# Patient Record
Sex: FEMALE | Race: BLACK OR AFRICAN AMERICAN | NOT HISPANIC OR LATINO | Employment: FULL TIME | URBAN - METROPOLITAN AREA
[De-identification: names, ages, dates, MRNs, and addresses within clinical notes are randomized per-mention and may not be internally consistent; named-entity substitution may affect disease eponyms.]

---

## 2017-04-26 ENCOUNTER — APPOINTMENT (OUTPATIENT)
Dept: PHYSICAL THERAPY | Facility: CLINIC | Age: 52
End: 2017-04-26
Payer: COMMERCIAL

## 2017-04-26 PROCEDURE — 97161 PT EVAL LOW COMPLEX 20 MIN: CPT

## 2017-05-03 ENCOUNTER — APPOINTMENT (OUTPATIENT)
Dept: PHYSICAL THERAPY | Facility: CLINIC | Age: 52
End: 2017-05-03
Payer: COMMERCIAL

## 2017-05-10 ENCOUNTER — APPOINTMENT (OUTPATIENT)
Dept: PHYSICAL THERAPY | Facility: CLINIC | Age: 52
End: 2017-05-10
Payer: COMMERCIAL

## 2017-05-10 PROCEDURE — 97112 NEUROMUSCULAR REEDUCATION: CPT

## 2017-05-17 ENCOUNTER — APPOINTMENT (OUTPATIENT)
Dept: PHYSICAL THERAPY | Facility: CLINIC | Age: 52
End: 2017-05-17
Payer: COMMERCIAL

## 2017-05-24 ENCOUNTER — APPOINTMENT (OUTPATIENT)
Dept: PHYSICAL THERAPY | Facility: CLINIC | Age: 52
End: 2017-05-24
Payer: COMMERCIAL

## 2017-05-24 PROCEDURE — 97112 NEUROMUSCULAR REEDUCATION: CPT

## 2017-05-31 ENCOUNTER — APPOINTMENT (OUTPATIENT)
Dept: PHYSICAL THERAPY | Facility: CLINIC | Age: 52
End: 2017-05-31
Payer: COMMERCIAL

## 2017-05-31 PROCEDURE — 97112 NEUROMUSCULAR REEDUCATION: CPT

## 2019-01-29 RX ORDER — NAPROXEN 500 MG/1
1 TABLET ORAL EVERY 12 HOURS
COMMUNITY
Start: 2014-10-30 | End: 2019-01-30

## 2019-01-29 RX ORDER — HYDROCHLOROTHIAZIDE 12.5 MG/1
CAPSULE, GELATIN COATED ORAL
COMMUNITY
End: 2019-01-30

## 2019-01-29 RX ORDER — GUAIFENESIN AND CODEINE PHOSPHATE 100; 10 MG/5ML; MG/5ML
SOLUTION ORAL
COMMUNITY
Start: 2015-01-20 | End: 2019-01-30

## 2019-01-29 RX ORDER — SUMATRIPTAN 100 MG/1
TABLET, FILM COATED ORAL
COMMUNITY
End: 2019-04-02 | Stop reason: SDUPTHER

## 2019-01-30 ENCOUNTER — OFFICE VISIT (OUTPATIENT)
Dept: FAMILY MEDICINE CLINIC | Facility: CLINIC | Age: 54
End: 2019-01-30
Payer: COMMERCIAL

## 2019-01-30 VITALS
HEIGHT: 65 IN | BODY MASS INDEX: 48.82 KG/M2 | DIASTOLIC BLOOD PRESSURE: 86 MMHG | RESPIRATION RATE: 22 BRPM | TEMPERATURE: 98.8 F | HEART RATE: 98 BPM | OXYGEN SATURATION: 97 % | WEIGHT: 293 LBS | SYSTOLIC BLOOD PRESSURE: 138 MMHG

## 2019-01-30 DIAGNOSIS — E55.9 VITAMIN D DEFICIENCY: ICD-10-CM

## 2019-01-30 DIAGNOSIS — G43.009 MIGRAINE WITHOUT AURA AND WITHOUT STATUS MIGRAINOSUS, NOT INTRACTABLE: ICD-10-CM

## 2019-01-30 DIAGNOSIS — F41.1 GENERALIZED ANXIETY DISORDER: ICD-10-CM

## 2019-01-30 DIAGNOSIS — I10 ESSENTIAL HYPERTENSION: Primary | ICD-10-CM

## 2019-01-30 DIAGNOSIS — G47.30 SLEEP APNEA, UNSPECIFIED TYPE: ICD-10-CM

## 2019-01-30 DIAGNOSIS — M25.552 BILATERAL HIP PAIN: ICD-10-CM

## 2019-01-30 DIAGNOSIS — M25.551 BILATERAL HIP PAIN: ICD-10-CM

## 2019-01-30 DIAGNOSIS — E66.01 MORBID OBESITY (HCC): ICD-10-CM

## 2019-01-30 DIAGNOSIS — K21.9 GASTROESOPHAGEAL REFLUX DISEASE, ESOPHAGITIS PRESENCE NOT SPECIFIED: ICD-10-CM

## 2019-01-30 PROBLEM — K21.00 REFLUX ESOPHAGITIS: Status: ACTIVE | Noted: 2019-01-30

## 2019-01-30 PROBLEM — M51.36 DDD (DEGENERATIVE DISC DISEASE), LUMBAR: Status: ACTIVE | Noted: 2018-02-21

## 2019-01-30 PROCEDURE — 3079F DIAST BP 80-89 MM HG: CPT | Performed by: INTERNAL MEDICINE

## 2019-01-30 PROCEDURE — 99204 OFFICE O/P NEW MOD 45 MIN: CPT | Performed by: INTERNAL MEDICINE

## 2019-01-30 PROCEDURE — 3075F SYST BP GE 130 - 139MM HG: CPT | Performed by: INTERNAL MEDICINE

## 2019-01-30 RX ORDER — MELOXICAM 15 MG/1
15 TABLET ORAL DAILY
Refills: 0 | COMMUNITY
Start: 2019-01-24 | End: 2019-04-02

## 2019-01-30 RX ORDER — ALPRAZOLAM 0.5 MG/1
TABLET ORAL
COMMUNITY
Start: 2017-08-08 | End: 2019-04-26 | Stop reason: SDUPTHER

## 2019-01-30 RX ORDER — RANITIDINE 150 MG/1
150 CAPSULE ORAL 2 TIMES DAILY
Qty: 60 CAPSULE | Refills: 1 | Status: SHIPPED | OUTPATIENT
Start: 2019-01-30 | End: 2019-01-30 | Stop reason: SDUPTHER

## 2019-01-30 RX ORDER — RANITIDINE 150 MG/1
150 CAPSULE ORAL 2 TIMES DAILY
Qty: 60 CAPSULE | Refills: 1 | Status: SHIPPED | OUTPATIENT
Start: 2019-01-30 | End: 2019-11-19 | Stop reason: ALTCHOICE

## 2019-01-30 RX ORDER — HYDROCHLOROTHIAZIDE 25 MG/1
25 TABLET ORAL
COMMUNITY
Start: 2018-02-13

## 2019-01-30 NOTE — PROGRESS NOTES
Assessment/Plan:       Diagnoses and all orders for this visit:    Essential hypertension  -     Hemoglobin A1C; Future  -     Comprehensive metabolic panel  -     Lipid panel  -     TSH, 3rd generation with Free T4 reflex  -     CBC and differential    Sleep apnea, unspecified type  -     Ambulatory referral to Neurology; Future    Morbid obesity (HCC)    Generalized anxiety disorder    Bilateral hip pain    Migraine without aura and without status migrainosus, not intractable    Vitamin D deficiency  -     Vitamin D 25 hydroxy; Future    Gastroesophageal reflux disease, esophagitis presence not specified  -     Discontinue: ranitidine (ZANTAC) 150 MG capsule; Take 1 capsule (150 mg total) by mouth 2 (two) times a day  -     ranitidine (ZANTAC) 150 MG capsule; Take 1 capsule (150 mg total) by mouth 2 (two) times a day    Other orders  -     ALPRAZolam (XANAX) 0 5 mg tablet; 1 tablet daily as needed for anxiety  -     hydrochlorothiazide (HYDRODIURIL) 25 mg tablet; Take 25 mg by mouth        Justice Yanez was seen and examined in the office today  Examination was largely normal but is noted to be overweight  We discussed that she should try and focus on her diet and exercise (what is tolerated with her chronic pain) to try and lose weight  Her blood pressure was a tad on the high side (142/92) and she will confirm her medication dosing at home  She was diagnosed with MAGGIE years prior but has not been treated  She reports that she no longer has a machine but I do think this is important to diagnose and treat properly  She does follow with pain management for her chronic hip and knee pain (has received injections with relief)  With her reflux symptoms, we discussed dietary changes, weight loss, as well as sleeping with a wedge pillow  We can try ranitidine for a short period as well  HM: Cscope done at [facility] as well as Mammogram    BMI Counseling: Body mass index is 50 85 kg/m²  Discussed the patient's BMI with her   The BMI is above average  BMI counseling and education was provided to the patient  Nutrition recommendations include decreasing overall calorie intake, 3-5 servings of fruits/vegetables daily and moderation in carbohydrate intake  Subjective:      Patient ID: Edith Alvarado is a 48 y o  female  Chermaryse Mastersoner is here today to establish care  Medical chart was reviewed updated  She previously was following with [facility] but is transferring care here  She generally has been feeling okay but does admit to gaining weight secondary to recent stressors  She reports her diet is not the greatest and is limited with exercise secondary to chronic pain in the knees/hips  She reports that was diagnosed with MAGGIE years prior  She was fitted for a mask but no longer has this  See discussion for detail  The following portions of the patient's history were reviewed and updated as appropriate: allergies, current medications, past family history, past medical history, past social history, past surgical history and problem list     Review of Systems   Constitutional: Positive for fatigue  Negative for chills, fever and unexpected weight change  HENT: Negative for sinus pain, sinus pressure and sore throat  Eyes: Negative for visual disturbance  Dry eyes   Respiratory: Negative for cough, chest tightness, shortness of breath and wheezing  Cardiovascular: Negative for chest pain, palpitations and leg swelling  Gastrointestinal: Negative for abdominal pain, blood in stool, constipation, diarrhea, nausea and vomiting  Indigestion   Musculoskeletal: Positive for arthralgias and back pain  Negative for myalgias  Neurological: Negative for dizziness, syncope, numbness and headaches  Psychiatric/Behavioral: Negative for dysphoric mood and sleep disturbance  The patient is not nervous/anxious            Objective:      /86   Pulse 98   Temp 98 8 °F (37 1 °C)   Resp 22   Ht 5' 5" (1 651 m)   Wt (!) 139 kg (305 lb 9 6 oz)   SpO2 97%   BMI 50 85 kg/m²          Physical Exam   Constitutional: She is oriented to person, place, and time  She appears well-developed and well-nourished  No distress  HENT:   Head: Normocephalic and atraumatic  Mouth/Throat: Oropharynx is clear and moist    Eyes: Pupils are equal, round, and reactive to light  Conjunctivae and EOM are normal  Right eye exhibits no discharge  Left eye exhibits no discharge  Neck: Normal range of motion  Neck supple  No tracheal deviation present  No thyromegaly present  Cardiovascular: Normal rate, regular rhythm and normal heart sounds  Pulmonary/Chest: Effort normal and breath sounds normal  No respiratory distress  She has no wheezes  Abdominal: Soft  Bowel sounds are normal  There is no tenderness  Musculoskeletal: Normal range of motion  Lymphadenopathy:     She has no cervical adenopathy  Neurological: She is alert and oriented to person, place, and time  No cranial nerve deficit  Skin: Skin is warm and dry  She is not diaphoretic  Psychiatric: She has a normal mood and affect  Her speech is normal and behavior is normal  Judgment and thought content normal    Vitals reviewed

## 2019-02-03 LAB — HBA1C MFR BLD HPLC: 4.8 %

## 2019-02-05 DIAGNOSIS — E55.9 VITAMIN D DEFICIENCY: Primary | ICD-10-CM

## 2019-02-05 RX ORDER — ERGOCALCIFEROL 1.25 MG/1
50000 CAPSULE ORAL WEEKLY
Qty: 12 CAPSULE | Refills: 0 | Status: SHIPPED | OUTPATIENT
Start: 2019-02-05 | End: 2019-04-23 | Stop reason: SDUPTHER

## 2019-02-12 ENCOUNTER — OFFICE VISIT (OUTPATIENT)
Dept: OBGYN CLINIC | Facility: MEDICAL CENTER | Age: 54
End: 2019-02-12
Payer: COMMERCIAL

## 2019-02-12 ENCOUNTER — APPOINTMENT (OUTPATIENT)
Dept: RADIOLOGY | Facility: CLINIC | Age: 54
End: 2019-02-12
Payer: COMMERCIAL

## 2019-02-12 VITALS
DIASTOLIC BLOOD PRESSURE: 89 MMHG | SYSTOLIC BLOOD PRESSURE: 136 MMHG | BODY MASS INDEX: 48.82 KG/M2 | WEIGHT: 293 LBS | HEIGHT: 65 IN | HEART RATE: 76 BPM

## 2019-02-12 DIAGNOSIS — M25.561 PAIN IN BOTH KNEES, UNSPECIFIED CHRONICITY: Primary | ICD-10-CM

## 2019-02-12 DIAGNOSIS — M25.562 PAIN IN BOTH KNEES, UNSPECIFIED CHRONICITY: Primary | ICD-10-CM

## 2019-02-12 DIAGNOSIS — M17.0 PRIMARY OSTEOARTHRITIS OF BOTH KNEES: ICD-10-CM

## 2019-02-12 DIAGNOSIS — M25.562 PAIN IN BOTH KNEES, UNSPECIFIED CHRONICITY: ICD-10-CM

## 2019-02-12 DIAGNOSIS — M25.561 PAIN IN BOTH KNEES, UNSPECIFIED CHRONICITY: ICD-10-CM

## 2019-02-12 PROCEDURE — 99203 OFFICE O/P NEW LOW 30 MIN: CPT | Performed by: ORTHOPAEDIC SURGERY

## 2019-02-12 PROCEDURE — 73564 X-RAY EXAM KNEE 4 OR MORE: CPT

## 2019-02-12 NOTE — PROGRESS NOTES
Orthopaedic Surgery - Office Note  Lizbeth Viveros (47 y o  female)   : 1965   MRN: [id number]  Encounter Date: 2019    Chief Complaint   Patient presents with    Left Knee - Pain    Right Knee - Pain       Assessment / Plan  Bilateral knee pain  Bilateral knee arthritis    No surgical intervention needed, narrowing medial compartment both knee's, patella crepitation  Start in physical therapy for LE strengthening VMO  If her symptoms persist than can try cortisone vs visco supplementation  OTC NSAIDs, alternate heat and ice  Proper diet and exercise reviewed with patient   Due to family hx of RA will get some blood work, if abnormal will call patient and refer to rheumatology  See back in 8 wks    History of Present Illness  Lizbeth Viveros is a 48 y o  female who presents for evaluation of bilateral knee pain  Ongoing pain for the past few months, no associated injury or trauma  Notes pain with kneeling, anterior  She will note increase in pain with activity exercise, more right knee medial joint line, no locking or catching in either knee  She is noticing increase in cracking, popping under patella  She has gained more weight the past few months  She has used mobic in past    Denies numbness or tingling in legs  Review of Systems  Pertinent items are noted in HPI  All other systems were reviewed and are negative  Physical Exam  /89   Pulse 76   Ht 5' 5" (1 651 m)   Wt (!) 139 kg (306 lb 9 6 oz)   BMI 51 02 kg/m²   Cons: Appears well  No apparent distress  Psych: Alert  Oriented x3  Mood and affect normal   Eyes: PERRLA, EOMI  Resp: Normal effort  No audible wheezing or stridor  CV: Palpable pulse  No discernable arrhythmia  No LE edema  Lymph:  No palpable cervical, axillary, or inguinal lymphadenopathy  Skin: Warm  No palpable masses  No visible lesions  Neuro: Normal muscle tone  Normal and symmetric DTR's       Bilateral Knee Exam  Alignment:  Normal knee alignment  Inspection:  No swelling  No edema  No erythema  Palpation:  Medial joint line tenderness right, anterior pain, left knee anterior tenderness only  ROM:  0 extension bilateral   Strength:  5/5 quadriceps and hamstrings  Stability:  No objective knee instability  Stable Varus / Valgus stress, Lachman, and Posterior drawer  Tests:  (-) Diego  Patella:  Patella tracks centrally with crepitus  Neurovascular:  Sensation intact in Ax/R/M/U nerve distributions  2+ radial pulse  Studies Reviewed  3 views of bilateral knee's demonstrate medial joint line narrowing bilateral , patellofemoral narrowing bilateral with patella chondromalacia  Mild to moderate medial compartment arthritis  Procedures  No procedures today  Medical, Surgical, Family, and Social History  The patient's medical history, family history, and social history, were reviewed and updated as appropriate      Past Medical History:   Diagnosis Date    Constipation     Hay fever     Pulmonary embolism (HCC)        Past Surgical History:   Procedure Laterality Date    HYSTERECTOMY  2013    MYOMECTOMY  1994    REDUCTION MAMMAPLASTY  2011       Family History   Problem Relation Age of Onset    Hypertension Mother    April Huber Rheum arthritis Mother     Prostate cancer Father     Hypertension Father     Heart attack Father     Glaucoma Father     Hypertension Maternal Grandmother     Stroke Maternal Grandmother     Hypertension Paternal Grandmother     Heart attack Paternal Grandmother     Glaucoma Paternal Grandmother     Depression Paternal Grandmother     Substance Abuse Paternal Grandmother     Alcohol abuse Paternal Grandmother        Social History     Occupational History    Not on file   Tobacco Use    Smoking status: Never Smoker    Smokeless tobacco: Never Used   Substance and Sexual Activity    Alcohol use: Yes     Comment: socially    Drug use: No    Sexual activity: Not on file       No Known Allergies      Current Outpatient Medications:     ALPRAZolam (XANAX) 0 5 mg tablet, 1 tablet daily as needed for anxiety, Disp: , Rfl:     ergocalciferol (VITAMIN D2) 50,000 units, Take 1 capsule (50,000 Units total) by mouth once a week, Disp: 12 capsule, Rfl: 0    hydrochlorothiazide (HYDRODIURIL) 25 mg tablet, Take 25 mg by mouth, Disp: , Rfl:     meloxicam (MOBIC) 15 mg tablet, Take 15 mg by mouth daily, Disp: , Rfl: 0    hylan (SYNVISC ONE) 48 MG/6ML injection, Inject into the joint, Disp: , Rfl:     ranitidine (ZANTAC) 150 MG capsule, Take 1 capsule (150 mg total) by mouth 2 (two) times a day (Patient not taking: Reported on 2/12/2019), Disp: 60 capsule, Rfl: 1    SUMAtriptan (IMITREX) 100 mg tablet, Take by mouth Pt requests BRAND NECCESSARY , Disp: , Rfl:       Dylan Jeferson    [facility]    I,:    am acting as a scribe while in the presence of the attending physician :        I,:    personally performed the services described in this documentation    as scribed in my presence :

## 2019-02-16 ENCOUNTER — APPOINTMENT (OUTPATIENT)
Dept: LAB | Facility: MEDICAL CENTER | Age: 54
End: 2019-02-16
Payer: COMMERCIAL

## 2019-02-16 DIAGNOSIS — I10 ESSENTIAL HYPERTENSION: ICD-10-CM

## 2019-02-16 DIAGNOSIS — M25.562 PAIN IN BOTH KNEES, UNSPECIFIED CHRONICITY: ICD-10-CM

## 2019-02-16 DIAGNOSIS — E55.9 VITAMIN D DEFICIENCY: ICD-10-CM

## 2019-02-16 DIAGNOSIS — M25.561 PAIN IN BOTH KNEES, UNSPECIFIED CHRONICITY: ICD-10-CM

## 2019-02-16 LAB
25(OH)D3 SERPL-MCNC: 26.8 NG/ML (ref 30–100)
ALBUMIN SERPL BCP-MCNC: 4 G/DL (ref 3.5–5)
ALP SERPL-CCNC: 126 U/L (ref 46–116)
ALT SERPL W P-5'-P-CCNC: 26 U/L (ref 12–78)
ANION GAP SERPL CALCULATED.3IONS-SCNC: 5 MMOL/L (ref 4–13)
AST SERPL W P-5'-P-CCNC: 11 U/L (ref 5–45)
BASOPHILS # BLD AUTO: 0.08 THOUSANDS/ΜL (ref 0–0.1)
BASOPHILS NFR BLD AUTO: 1 % (ref 0–1)
BILIRUB SERPL-MCNC: 1.08 MG/DL (ref 0.2–1)
BUN SERPL-MCNC: 22 MG/DL (ref 5–25)
CALCIUM SERPL-MCNC: 9.3 MG/DL (ref 8.3–10.1)
CHLORIDE SERPL-SCNC: 102 MMOL/L (ref 100–108)
CHOLEST SERPL-MCNC: 170 MG/DL (ref 50–200)
CO2 SERPL-SCNC: 29 MMOL/L (ref 21–32)
CREAT SERPL-MCNC: 0.97 MG/DL (ref 0.6–1.3)
CRP SERPL QL: 27.2 MG/L
EOSINOPHIL # BLD AUTO: 0.08 THOUSAND/ΜL (ref 0–0.61)
EOSINOPHIL NFR BLD AUTO: 1 % (ref 0–6)
ERYTHROCYTE [DISTWIDTH] IN BLOOD BY AUTOMATED COUNT: 12.1 % (ref 11.6–15.1)
ERYTHROCYTE [SEDIMENTATION RATE] IN BLOOD: 79 MM/HOUR (ref 0–20)
EST. AVERAGE GLUCOSE BLD GHB EST-MCNC: 100 MG/DL
GFR SERPL CREATININE-BSD FRML MDRD: 78 ML/MIN/1.73SQ M
GLUCOSE P FAST SERPL-MCNC: 93 MG/DL (ref 65–99)
HBA1C MFR BLD: 5.1 % (ref 4.2–6.3)
HCT VFR BLD AUTO: 43.3 % (ref 34.8–46.1)
HDLC SERPL-MCNC: 49 MG/DL (ref 40–60)
HGB BLD-MCNC: 13.4 G/DL (ref 11.5–15.4)
IMM GRANULOCYTES # BLD AUTO: 0.03 THOUSAND/UL (ref 0–0.2)
IMM GRANULOCYTES NFR BLD AUTO: 0 % (ref 0–2)
LDLC SERPL CALC-MCNC: 113 MG/DL (ref 0–100)
LYMPHOCYTES # BLD AUTO: 4.36 THOUSANDS/ΜL (ref 0.6–4.47)
LYMPHOCYTES NFR BLD AUTO: 33 % (ref 14–44)
MCH RBC QN AUTO: 29.3 PG (ref 26.8–34.3)
MCHC RBC AUTO-ENTMCNC: 30.9 G/DL (ref 31.4–37.4)
MCV RBC AUTO: 95 FL (ref 82–98)
MONOCYTES # BLD AUTO: 0.69 THOUSAND/ΜL (ref 0.17–1.22)
MONOCYTES NFR BLD AUTO: 5 % (ref 4–12)
NEUTROPHILS # BLD AUTO: 8.18 THOUSANDS/ΜL (ref 1.85–7.62)
NEUTS SEG NFR BLD AUTO: 60 % (ref 43–75)
NONHDLC SERPL-MCNC: 121 MG/DL
NRBC BLD AUTO-RTO: 0 /100 WBCS
PLATELET # BLD AUTO: 289 THOUSANDS/UL (ref 149–390)
PMV BLD AUTO: 11.4 FL (ref 8.9–12.7)
POTASSIUM SERPL-SCNC: 3.8 MMOL/L (ref 3.5–5.3)
PROT SERPL-MCNC: 8.9 G/DL (ref 6.4–8.2)
RBC # BLD AUTO: 4.58 MILLION/UL (ref 3.81–5.12)
SODIUM SERPL-SCNC: 136 MMOL/L (ref 136–145)
TRIGL SERPL-MCNC: 42 MG/DL
TSH SERPL DL<=0.05 MIU/L-ACNC: 2.49 UIU/ML (ref 0.36–3.74)
WBC # BLD AUTO: 13.42 THOUSAND/UL (ref 4.31–10.16)

## 2019-02-16 PROCEDURE — 86431 RHEUMATOID FACTOR QUANT: CPT

## 2019-02-16 PROCEDURE — 86140 C-REACTIVE PROTEIN: CPT

## 2019-02-16 PROCEDURE — 85652 RBC SED RATE AUTOMATED: CPT

## 2019-02-16 PROCEDURE — 36415 COLL VENOUS BLD VENIPUNCTURE: CPT

## 2019-02-16 PROCEDURE — 86200 CCP ANTIBODY: CPT

## 2019-02-18 ENCOUNTER — TRANSCRIBE ORDERS (OUTPATIENT)
Dept: LAB | Facility: MEDICAL CENTER | Age: 54
End: 2019-02-18

## 2019-02-27 LAB — MISCELLANEOUS LAB TEST RESULT: NORMAL

## 2019-04-02 ENCOUNTER — OFFICE VISIT (OUTPATIENT)
Dept: FAMILY MEDICINE CLINIC | Facility: CLINIC | Age: 54
End: 2019-04-02
Payer: COMMERCIAL

## 2019-04-02 VITALS
RESPIRATION RATE: 20 BRPM | OXYGEN SATURATION: 97 % | HEART RATE: 92 BPM | DIASTOLIC BLOOD PRESSURE: 74 MMHG | TEMPERATURE: 97.8 F | BODY MASS INDEX: 48.82 KG/M2 | WEIGHT: 293 LBS | HEIGHT: 65 IN | SYSTOLIC BLOOD PRESSURE: 128 MMHG

## 2019-04-02 DIAGNOSIS — I10 ESSENTIAL HYPERTENSION: Primary | ICD-10-CM

## 2019-04-02 DIAGNOSIS — K21.00 REFLUX ESOPHAGITIS: ICD-10-CM

## 2019-04-02 DIAGNOSIS — G43.009 MIGRAINE WITHOUT AURA AND WITHOUT STATUS MIGRAINOSUS, NOT INTRACTABLE: ICD-10-CM

## 2019-04-02 DIAGNOSIS — J30.2 SEASONAL ALLERGIES: ICD-10-CM

## 2019-04-02 DIAGNOSIS — R13.12 OROPHARYNGEAL DYSPHAGIA: ICD-10-CM

## 2019-04-02 PROCEDURE — 3078F DIAST BP <80 MM HG: CPT | Performed by: INTERNAL MEDICINE

## 2019-04-02 PROCEDURE — 3074F SYST BP LT 130 MM HG: CPT | Performed by: INTERNAL MEDICINE

## 2019-04-02 PROCEDURE — 3008F BODY MASS INDEX DOCD: CPT | Performed by: INTERNAL MEDICINE

## 2019-04-02 PROCEDURE — 99214 OFFICE O/P EST MOD 30 MIN: CPT | Performed by: INTERNAL MEDICINE

## 2019-04-02 RX ORDER — SUMATRIPTAN 100 MG/1
100 TABLET, FILM COATED ORAL ONCE AS NEEDED
Qty: 10 TABLET | Refills: 1 | Status: SHIPPED | OUTPATIENT
Start: 2019-04-02

## 2019-04-02 RX ORDER — MONTELUKAST SODIUM 10 MG/1
10 TABLET ORAL DAILY
Qty: 30 TABLET | Refills: 1 | Status: SHIPPED | OUTPATIENT
Start: 2019-04-02 | End: 2019-04-25 | Stop reason: SDUPTHER

## 2019-04-03 ENCOUNTER — TELEPHONE (OUTPATIENT)
Dept: FAMILY MEDICINE CLINIC | Facility: CLINIC | Age: 54
End: 2019-04-03

## 2019-04-09 ENCOUNTER — OFFICE VISIT (OUTPATIENT)
Dept: OBGYN CLINIC | Facility: MEDICAL CENTER | Age: 54
End: 2019-04-09
Payer: COMMERCIAL

## 2019-04-09 VITALS
WEIGHT: 293 LBS | SYSTOLIC BLOOD PRESSURE: 117 MMHG | HEART RATE: 85 BPM | BODY MASS INDEX: 48.82 KG/M2 | DIASTOLIC BLOOD PRESSURE: 83 MMHG | HEIGHT: 65 IN

## 2019-04-09 DIAGNOSIS — M17.0 PRIMARY OSTEOARTHRITIS OF BOTH KNEES: Primary | ICD-10-CM

## 2019-04-09 PROCEDURE — 99213 OFFICE O/P EST LOW 20 MIN: CPT | Performed by: ORTHOPAEDIC SURGERY

## 2019-04-16 ENCOUNTER — OFFICE VISIT (OUTPATIENT)
Dept: SLEEP CENTER | Facility: CLINIC | Age: 54
End: 2019-04-16
Payer: COMMERCIAL

## 2019-04-16 VITALS
HEART RATE: 60 BPM | BODY MASS INDEX: 48.82 KG/M2 | DIASTOLIC BLOOD PRESSURE: 62 MMHG | SYSTOLIC BLOOD PRESSURE: 110 MMHG | WEIGHT: 293 LBS | HEIGHT: 65 IN

## 2019-04-16 DIAGNOSIS — E66.01 MORBID OBESITY (HCC): ICD-10-CM

## 2019-04-16 DIAGNOSIS — M17.0 PRIMARY OSTEOARTHRITIS OF BOTH KNEES: ICD-10-CM

## 2019-04-16 DIAGNOSIS — J30.2 SEASONAL ALLERGIES: ICD-10-CM

## 2019-04-16 DIAGNOSIS — I10 ESSENTIAL HYPERTENSION: ICD-10-CM

## 2019-04-16 DIAGNOSIS — G47.33 OBSTRUCTIVE SLEEP APNEA SYNDROME: Primary | ICD-10-CM

## 2019-04-16 PROCEDURE — 99245 OFF/OP CONSLTJ NEW/EST HI 55: CPT | Performed by: PSYCHIATRY & NEUROLOGY

## 2019-04-16 RX ORDER — ZOLPIDEM TARTRATE 5 MG/1
5 TABLET ORAL
Qty: 2 TABLET | Refills: 0 | Status: SHIPPED | OUTPATIENT
Start: 2019-04-16 | End: 2019-11-19 | Stop reason: ALTCHOICE

## 2019-04-17 ENCOUNTER — TELEPHONE (OUTPATIENT)
Dept: OTHER | Facility: OTHER | Age: 54
End: 2019-04-17

## 2019-04-23 DIAGNOSIS — E55.9 VITAMIN D DEFICIENCY: ICD-10-CM

## 2019-04-23 RX ORDER — ERGOCALCIFEROL 1.25 MG/1
CAPSULE ORAL
Qty: 12 CAPSULE | Refills: 0 | Status: SHIPPED | OUTPATIENT
Start: 2019-04-23

## 2019-04-25 DIAGNOSIS — J30.2 SEASONAL ALLERGIES: ICD-10-CM

## 2019-04-25 RX ORDER — MONTELUKAST SODIUM 10 MG/1
TABLET ORAL
Qty: 30 TABLET | Refills: 0 | Status: SHIPPED | OUTPATIENT
Start: 2019-04-25

## 2019-04-26 ENCOUNTER — TELEPHONE (OUTPATIENT)
Dept: FAMILY MEDICINE CLINIC | Facility: CLINIC | Age: 54
End: 2019-04-26

## 2019-04-26 DIAGNOSIS — F41.1 GENERALIZED ANXIETY DISORDER: Primary | ICD-10-CM

## 2019-04-26 RX ORDER — ALPRAZOLAM 0.5 MG/1
0.5 TABLET ORAL
Qty: 20 TABLET | Refills: 1 | Status: SHIPPED | OUTPATIENT
Start: 2019-04-26

## 2019-04-30 ENCOUNTER — TELEPHONE (OUTPATIENT)
Dept: FAMILY MEDICINE CLINIC | Facility: CLINIC | Age: 54
End: 2019-04-30

## 2019-05-02 ENCOUNTER — TELEPHONE (OUTPATIENT)
Dept: FAMILY MEDICINE CLINIC | Facility: CLINIC | Age: 54
End: 2019-05-02

## 2019-05-23 ENCOUNTER — HOSPITAL ENCOUNTER (OUTPATIENT)
Dept: SLEEP CENTER | Facility: CLINIC | Age: 54
Discharge: HOME | End: 2019-05-23
Payer: COMMERCIAL

## 2019-05-23 DIAGNOSIS — G47.33 OBSTRUCTIVE SLEEP APNEA SYNDROME: ICD-10-CM

## 2019-05-23 PROCEDURE — 95811 POLYSOM 6/>YRS CPAP 4/> PARM: CPT

## 2019-05-23 PROCEDURE — 95811 POLYSOM 6/>YRS CPAP 4/> PARM: CPT | Performed by: PSYCHIATRY & NEUROLOGY

## 2019-05-30 ENCOUNTER — TELEPHONE (OUTPATIENT)
Dept: SLEEP CENTER | Facility: CLINIC | Age: 54
End: 2019-05-30

## 2019-06-03 DIAGNOSIS — G47.33 OBSTRUCTIVE SLEEP APNEA SYNDROME: Primary | ICD-10-CM

## 2019-09-18 ENCOUNTER — RX ONLY (RX ONLY)
Age: 54
End: 2019-09-18

## 2019-09-18 ENCOUNTER — DOCTOR'S OFFICE (OUTPATIENT)
Dept: URBAN - METROPOLITAN AREA CLINIC 136 | Facility: CLINIC | Age: 54
Setting detail: OPHTHALMOLOGY
End: 2019-09-18

## 2019-09-18 DIAGNOSIS — H52.03: ICD-10-CM

## 2019-09-18 PROCEDURE — SELFPAYVIS VISION VISIT SELF PAY: Performed by: OPTOMETRIST

## 2019-09-18 ASSESSMENT — REFRACTION_AUTOREFRACTION
OS_CYLINDER: SPH
OS_SPHERE: +1.50
OD_SPHERE: +1.25
OD_CYLINDER: SPH

## 2019-09-18 ASSESSMENT — REFRACTION_CURRENTRX
OD_OVR_VA: 20/
OS_OVR_VA: 20/
OD_OVR_VA: 20/
OD_VPRISM_DIRECTION: SV
OS_OVR_VA: 20/
OD_OVR_VA: 20/
OD_AXIS: 62
OS_OVR_VA: 20/
OS_CYLINDER: SPH
OD_CYLINDER: -0.25
OS_VPRISM_DIRECTION: SV
OD_SPHERE: +2.50
OS_SPHERE: +2.75

## 2019-09-18 ASSESSMENT — REFRACTION_MANIFEST
OD_CYLINDER: -0.25
OD_SPHERE: +1.00
OD_AXIS: 060
OS_SPHERE: +1.00
OS_VA3: 20/
OS_VA2: 20/
OS_CYLINDER: SPH
OS_VA3: 20/
OS_VA1: 20/
OS_VA1: 20/20
OD_VA2: 20/
OD_VA3: 20/
OD_VA1: 20/
OD_ADD: +2.00
OS_ADD: +2.00
OD_VA1: 20/20
OD_VA2: 20/
OU_VA: 20/
OS_VA2: 20/
OD_VA3: 20/
OU_VA: 20/20

## 2019-09-18 ASSESSMENT — SPHEQUIV_DERIVED: OD_SPHEQUIV: 0.875

## 2019-09-18 ASSESSMENT — VISUAL ACUITY
OD_BCVA: 20/40-1
OS_BCVA: 20/40

## 2019-09-18 ASSESSMENT — CONFRONTATIONAL VISUAL FIELD TEST (CVF)
OS_FINDINGS: FULL
OD_FINDINGS: FULL

## 2019-11-12 ENCOUNTER — TELEPHONE (OUTPATIENT)
Dept: SLEEP CENTER | Facility: CLINIC | Age: 54
End: 2019-11-12

## 2019-11-12 ENCOUNTER — TELEPHONE (OUTPATIENT)
Dept: OBGYN CLINIC | Facility: HOSPITAL | Age: 54
End: 2019-11-12

## 2019-11-12 DIAGNOSIS — M17.0 PRIMARY OSTEOARTHRITIS OF BOTH KNEES: Primary | ICD-10-CM

## 2019-11-12 NOTE — TELEPHONE ENCOUNTER
Caller: patient  Call back number: [phone]  Patient's doctor: Dr Karthikeyan Leblanc    Patient called asking for a new script for PT

## 2019-11-12 NOTE — TELEPHONE ENCOUNTER
Patient called to ask about picking up machine- advised her of follow-up & DME set-up next week in [facility]  Appointment information emailed to Margoth Mendez

## 2019-11-19 ENCOUNTER — OFFICE VISIT (OUTPATIENT)
Dept: SLEEP CENTER | Facility: CLINIC | Age: 54
End: 2019-11-19
Payer: COMMERCIAL

## 2019-11-19 VITALS
DIASTOLIC BLOOD PRESSURE: 72 MMHG | BODY MASS INDEX: 48.82 KG/M2 | WEIGHT: 293 LBS | HEART RATE: 80 BPM | HEIGHT: 65 IN | SYSTOLIC BLOOD PRESSURE: 116 MMHG

## 2019-11-19 DIAGNOSIS — G47.33 OBSTRUCTIVE SLEEP APNEA SYNDROME: Primary | ICD-10-CM

## 2019-11-19 DIAGNOSIS — E66.01 MORBID OBESITY (HCC): ICD-10-CM

## 2019-11-19 PROCEDURE — 99214 OFFICE O/P EST MOD 30 MIN: CPT | Performed by: NURSE PRACTITIONER

## 2019-11-19 NOTE — PATIENT INSTRUCTIONS
1   Call the [facility] with the home equipment company you plan to use, so rx can be faxed for your CPAP equipment  And Schedule compliance follow-up visit in 2-3 months after beginning use  2  Schedule an appointment with the equipment company for set up with CPAP  3  Begin using your CPAP equipment every night  4  Begin cleaning your CPAP daily after use and once weekly with 1 part white vinegar and 10 parts water  4  Contact your medical equipment distributor regarding any questions concerning your CPAP equipment  5  Contact the Sleep [address] [place] [address] with any other questions, prior to next visit, if needed        Nursing Support:  When: Monday through Friday 7A-5PM except holidays  Where: Our direct line is [phone]  If you are having a true emergency please call 911  In the event that the line is busy or it is after hours please leave a voice message and we will return your call  Please speak clearly, leaving your full name, birth date, best number to reach you and the reason for your call  Medication refills: We will need the name of the medication, the dosage, the ordering provider, whether you get a 30 or 90 day refill, and the pharmacy name and address  Medications will be ordered by the provider only  Nurses cannot call in prescriptions  Please allow 7 days for medication refills  Physician requested updates: If your provider requested that you call with an update after starting medication, please be ready to provide us the medication and dosage, what time you take your medication, the time you attempt to fall asleep, time you fall asleep, when you wake up, and what time you get out of bed  Sleep Study Results: We will contact you with sleep study results and/or next steps after the physician has reviewed your testing

## 2019-11-19 NOTE — PROGRESS NOTES
Progress Note - [address] 48 y o  female   :1965, MRN: [id number]  2019          Follow Up Evaluation / Problem: Moderate to Severe Obstructive Sleep Apnea  Morbid Obesity      Thank you for the opportunity of participating in the evaluation and care of this patient in the Sleep Clinic at [facility]  HPI: Dhara Springer is a 48y o  year old female  She presents for follow up of moderate to severe MAGGIE  She completed a split night sleep study in May 2019  She was previously diagnosed with MAGGIE and briefly used CPAP, but had difficulty tolerating its use  She is unable to recall exactly what caused the difficulties  She admits that a bit of time has passed since her testing was completed until now and states that "life got in the way"  She is here to review the test results and plan of treatment      Review of Systems      Genitourinary need to urinate more than twice a night   Cardiology none   Gastrointestinal frequent heartburn/acid reflux   Neurology awaken with headache and numbness/tingling of an extremity   Constitutional fatigue   Integumentary none   Psychiatry anxiety   Musculoskeletal muscle aches, back pain and sciatica   Pulmonary shortness of breath with activity, snoring and difficulty breathing when lying flat    ENT throat clearing   Endocrine none   Hematological none       Current Outpatient Medications:     ALPRAZolam (XANAX) 0 5 mg tablet, Take 1 tablet (0 5 mg total) by mouth daily at bedtime as needed for anxiety, Disp: 20 tablet, Rfl: 1    ergocalciferol (VITAMIN D2) 50,000 units, TAKE ONE CAPSULE BY MOUTH ONE TIME PER WEEK, Disp: 12 capsule, Rfl: 0    hydrochlorothiazide (HYDRODIURIL) 25 mg tablet, Take 25 mg by mouth, Disp: , Rfl:     montelukast (SINGULAIR) 10 mg tablet, TAKE 1 TABLET BY MOUTH EVERY DAY, Disp: 30 tablet, Rfl: 0    SUMAtriptan (IMITREX) 100 mg tablet, Take 1 tablet (100 mg total) by mouth once as needed for migraine for up to 1 dose Pt requests BRAND NECCESSARY, Disp: 10 tablet, Rfl: 1    Montreal Sleepiness Scale  Sitting and reading: Moderate chance of dozing  Watching TV: Moderate chance of dozing  Sitting, inactive in a public place (e g  a theatre or a meeting): Slight chance of dozing  As a passenger in a car for an hour without a break: Slight chance of dozing  Lying down to rest in the afternoon when circumstances permit: High chance of dozing  Sitting and talking to someone: Would never doze  Sitting quietly after a lunch without alcohol: Slight chance of dozing  In a car, while stopped for a few minutes in traffic: Would never doze  Total score: 10              Vitals:    11/19/19 1400   BP: 116/72   Pulse: 80   Weight: (!) 139 kg (306 lb 12 8 oz)   Height: 5' 5" (1 651 m)       Body mass index is 51 05 kg/m²  Neck Circumference: 17 5       EPWORTH SLEEPINESS SCORE  Total score: 10      Past History Since Last Sleep Center Visit:   She denies any significant changes to her health since her last visit  Results of the split night sleep study, completed on 5/23/19, are as follows:  Sleep latency was 19 minutes and 51 seconds  REM latency was 1 hour 7 minutes and 30 seconds  Sleep efficiency  was 82 8%  REM sleep was somewhat diminished at 10 3%  A total of 45 abnormal breathing events were  identified during the course of the study  The AHI was 22 3 abnormal breathing events per hour  During REM this  number increased to 76 8%  Lowest measured oxygen saturation during the study was 85%  No periodic limb  movements were seen  A total of 21 arousals were identified  The arousal index is 10 4 events per hour  During the  treatment phase of the study nasal CPAP was started at 5 cm and gradually increased to 9 cm of water for control of  snoring and abnormal breathing   The patient appeared to do best using 9 cm of water delivered using a [employer] and  [employer]kel Eson 2 interface  Continued use of this equipment at the settings is recommended  The review of systems and following portions of the patient's history were reviewed and updated as appropriate: allergies, current medications, past family history, past medical history, past social history, past surgical history, and problem list         OBJECTIVE    PAP Pressure: Nasal CPAP set to deliver 9 cm of water pressure  DME Provider: [facility] [facility]    Physical Exam:     General Appearance:   Alert, cooperative, no distress, appears stated age, morbidly obese     Head:   Normocephalic, without obvious abnormality, atraumatic     Eyes:   PERRL, conjunctiva/corneas clear, EOM's intact          Nose:  Nares normal, septum midline, no drainage or sinus tenderness           Throat:  Lips, teeth and gums normal; tongue normal size and  shape and midline mucosa moist and redundant with low-lying soft palatal tissue, uvula barely visualized, tonsils not visualized, Mallampati class 4       Neck:  Supple, symmetrical, trachea midline, no adenopathy; Thyroid: No enlargement, tenderness or nodules; no carotid bruit or JVD     Lungs:      Clear to auscultation bilaterally, respirations unlabored     Heart:   Regular rate and rhythm, S1 and S2 normal, no murmur, rub or gallop       Extremities:  Extremities normal, atraumatic, no cyanosis or edema       Skin:  Skin color, texture, turgor normal, no rashes or lesions       Neurologic:  No focal deficits noted       ASSESSMENT / PLAN    1  Obstructive sleep apnea syndrome     2  Morbid obesity ([facility] [address] )         Plan:  Begin use of CPAP during sleep  Schedule compliance visit 2-3 months after start of use  Continue to work on weight loss with healthy changes to diet and exercise    Counseling / Coordination of Care  Total clinic time spent today 30 minutes   Greater than 50% of total time was spent with the patient and / or family counseling and / or coordination of care      A description of the counseling / coordination of care:     Impressions, Diagnostic results, Prognosis, Instructions for management, Risks and benefits of treatment, Patient and family education, Risk factor reductions and Importance of compliance with treatment    I reviewed the recent test results with the patient  We talked about the abnormal findings, including those consistent with Obstructive Sleep Apnea  We then discussed how the these are categorized as mild, moderate or severe  We also covered the medical comorbidities associated with untreated Obstructive Sleep Apnea including, hypertension, cardiac arrythmia, myocardial infarction and stroke  I explained how the risk of these conditions increases with the severity of the test results  I also spoke in some detail about the most common treatment options including weight loss, nasal PAP, mandibular advancement devices and uvulopalatopharyngoplasty (UPPP)  I answered all questions posed to me and gave my opinion regarding the best options for treatment based on the patient and their level of disease  In this case she chose to begin treatment using CPAP set at 9cm of water pressure  The patient will return in 4 to 12 weeks for a review of compliance data collected since beginning treatment  We will discuss this data and talk about any problems that may prevent successful treatment of Obstructive Sleep Apnea  Changes will be made in treatment parameters or interface devices in order to improve her ability to continue using PAP to maintain upper airway patency during sleep  The following instructions have been given to the patient today:    Patient Instructions    1  Call the [facility] [address] with the home equipment company you plan to use, so rx can be faxed for your CPAP equipment  And Schedule compliance follow-up visit in 2-3 months after beginning use  2    Schedule an appointment with the equipment company for set up with CPAP  3  Begin using your CPAP equipment every night  4  Begin cleaning your CPAP daily after use and once weekly with 1 part white vinegar and 10 parts water  4  Contact your medical equipment distributor regarding any questions concerning your CPAP equipment  5  Contact the [address] with any other questions, prior to next visit, if needed        Nursing Support:  When: Monday through Friday 7A-5PM except holidays  Where: Our direct line is [phone]  If you are having a true emergency please call 911  In the event that the line is busy or it is after hours please leave a voice message and we will return your call  Please speak clearly, leaving your full name, birth date, best number to reach you and the reason for your call  Medication refills: We will need the name of the medication, the dosage, the ordering provider, whether you get a 30 or 90 day refill, and the pharmacy name and address  Medications will be ordered by the provider only  Nurses cannot call in prescriptions  Please allow 7 days for medication refills  Physician requested updates: If your provider requested that you call with an update after starting medication, please be ready to provide us the medication and dosage, what time you take your medication, the time you attempt to fall asleep, time you fall asleep, when you wake up, and what time you get out of bed  Sleep Study Results: We will contact you with sleep study results and/or next steps after the physician has reviewed your testing          Ebony Vidal, [address] [place] [address]

## 2019-11-20 ENCOUNTER — EVALUATION (OUTPATIENT)
Dept: PHYSICAL THERAPY | Facility: MEDICAL CENTER | Age: 54
End: 2019-11-20
Payer: COMMERCIAL

## 2019-11-20 ENCOUNTER — TELEPHONE (OUTPATIENT)
Dept: SLEEP CENTER | Facility: CLINIC | Age: 54
End: 2019-11-20

## 2019-11-20 DIAGNOSIS — M17.0 PRIMARY OSTEOARTHRITIS OF BOTH KNEES: Primary | ICD-10-CM

## 2019-11-20 PROCEDURE — 97161 PT EVAL LOW COMPLEX 20 MIN: CPT | Performed by: PHYSICAL THERAPIST

## 2019-11-20 PROCEDURE — 97112 NEUROMUSCULAR REEDUCATION: CPT | Performed by: PHYSICAL THERAPIST

## 2019-11-20 NOTE — PROGRESS NOTES
PT Evaluation     Today's date: 2019  Patient name: Melania Jones  : 1965  MRN: [id number]  Referring provider: Royer Flores PA-C  Dx:   Encounter Diagnosis   Name Primary?  Primary osteoarthritis of both knees                   Assessment  Assessment details: Melania Jones is a 49 y/o female who presents with complaints of bilateral knee pain  The patients greatest concerns are not being able to walk and stay active without pain  Patient presents with medial joint line pain bilaterally and difficulty performing functional activities such as, squatting, walking, and negotiating stairs  She presents with hip accessory weakness and severe deconditioning  Primary movement impairment diagnosis of bilateral LE weakness, resulting in pathoanatomical symptoms of primary osteoarthritis of bilateral knees, which limits her ability to perform functional activities without pain  Pt  will benefit from skilled PT services that includes manual therapy techniques to enhance tissue extensibility, neuromuscular re-education to facilitate motor control, therapeutic exercise to increase functional mobility, and modalities prn to reduce pain and inflammation    Impairments: abnormal coordination, abnormal gait, abnormal muscle firing, abnormal or restricted ROM, abnormal movement, activity intolerance, impaired balance, impaired physical strength, lacks appropriate home exercise program, pain with function, weight-bearing intolerance and poor body mechanics  Barriers to therapy: BMI, severe deconditioning, hx of LBP and hip pain  Understanding of Dx/Px/POC: good   Prognosis: good    Goals  Impairment Goals  - Pt I with initial HEP in 1-2 visits  - Improve ROM equal to contralateral side in 4-6 weeks  - Increase strength to 5/5 in all affected areas in 4-6 weeks    Functional Goals  - Increase Functional Status Measure to: 59+ in 6-8 weeks  - Patient will be independent with comprehensive HEP in 6-8 weeks  - Ambulation is improved to prior level of function in 6-8 weeks  - Stair climbing is improved to prior level of function in 6-8 weeks  - Squatting is improved to prior level of function in 6-8 weeks    Plan  Patient would benefit from: PT eval  Planned modality interventions: thermotherapy: hydrocollator packs  Planned therapy interventions: joint mobilization, manual therapy, neuromuscular re-education, patient education, postural training, strengthening, stretching, therapeutic exercise, home exercise program, graded exercise, functional ROM exercises, flexibility, balance, balance/weight bearing training, body mechanics training and abdominal trunk stabilization  Frequency: 1-2x/week for 6-8 weeks  Treatment plan discussed with: patient      Subjective Evaluation    History of Present Illness  Mechanism of injury: Patient reports that her knees started bothering her over 6 months ago  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  She has been walking less and gained weight recently  Patient would like to begin a workout routine and increase her physical activity tolerance  Pain  Current pain ratin  At best pain ratin  At worst pain ratin  Quality: throbbing  Relieving factors: rest (ibuprofen)  Aggravating factors: walking, standing and stair climbing      Diagnostic Tests  Abnormal x-ray: Degenerative changes as noted on Xray  Treatments  Current treatment: physical therapy  Patient Goals  Patient goals for therapy: increased strength, decreased pain, improved balance, increased motion and independence with ADLs/IADLs        Objective     Observations   Left Knee   Negative for deformity, edema, effusion and trophic changes  Right Knee   Negative for deformity, edema, effusion and trophic changes  Palpation   Left   Tenderness of the rectus femoris, vastus lateralis and vastus medialis       Right   Tenderness of the rectus femoris, vastus lateralis and vastus medialis  Tenderness   Left Knee   Tenderness in the ITB and medial joint line  Right Knee   Tenderness in the ITB and medial joint line  Neurological Testing     Sensation     Knee   Left Knee   Intact: light touch    Right Knee   Intact: light touch     Additional Neurological Details  Reflexes NT  Active Range of Motion   Left Knee   Normal active range of motion    Right Knee   Normal active range of motion    Mobility   Patellar Mobility:   Left Knee   WFL: medial, lateral, superior and inferior  Right Knee   WFL: medial, lateral, superior and inferior  Tibiofemoral Mobility:   Left knee   Tibiofemoral tendons within functional limits include the anterior and posterior  Right knee Tibiofemoral tendons within functional limits include the anterior and posterior  Patellar Static Positioning   Left Knee: WFL  Right Knee: [facility]    Strength/Myotome Testing     Left Hip   Planes of Motion   Flexion: 3+  Extension: 3-  Abduction: 3+  Adduction: 3    Isolated Muscles   Gluteus carmen: 3-  Gluteus medius: 3+  Iliopsoas: 3+    Right Hip   Planes of Motion   Flexion: 3+  Extension: 3-  Abduction: 3+  Adduction: 3-    Isolated Muscles   Gluteus maximums: 3-  Gluteus medius: 3+  Iliopsoas: 3+    Left Knee   Flexion: 3+  Extension: 3+  Quadriceps contraction: fair    Right Knee   Flexion: 3+  Extension: 4-  Quadriceps contraction: fair    Tests     Left Hip   Positive Jimym  90/90 SLR: Negative  Right Hip   Positive Jimmy  90/90 SLR: Negative  Left Knee   Positive patella-femoral grind  Negative lateral Diego, medial Diego, valgus stress test at 0 degrees and varus stress test at 0 degrees  Right Knee   Positive patella-femoral grind  Negative lateral Diego, medial Diego, valgus stress test at 0 degrees and varus stress test at 0 degrees  General Comments:      Knee Comments  No swelling noted         Precautions:  HTN      Manual  11/20 Exercise Diary              Bike             Bridges 3x10            Clams 30x5s            Quad sets             SLRx4             Glute sets             Hip adduction c/ ball             Sit <-> stand             Lateral band walks             SL balance                                       Quad str 3x30s            Ham str 3x30s                                                                                                           Modalities                                                          Flowsheet Rows      Most Recent Value   PT/OT G-Codes   Current Score  46   Projected Score  59          Vania Valenzuela, BROOK  11/20/2019,11:12 AM

## 2019-11-21 ENCOUNTER — TELEPHONE (OUTPATIENT)
Dept: SLEEP CENTER | Facility: CLINIC | Age: 54
End: 2019-11-21

## 2019-11-21 NOTE — TELEPHONE ENCOUNTER
Patient left message on nurse line, requesting script be sent to [facility]  All paperwork sent to [facility]  Patient aware  Scheduled compliance follow up for 2/4/20 at 1530 with Quynh Schmidt

## 2019-11-25 ENCOUNTER — OFFICE VISIT (OUTPATIENT)
Dept: PHYSICAL THERAPY | Facility: MEDICAL CENTER | Age: 54
End: 2019-11-25
Payer: COMMERCIAL

## 2019-11-25 DIAGNOSIS — M17.0 PRIMARY OSTEOARTHRITIS OF BOTH KNEES: Primary | ICD-10-CM

## 2019-11-25 PROCEDURE — 97112 NEUROMUSCULAR REEDUCATION: CPT | Performed by: PHYSICAL THERAPIST

## 2019-11-25 PROCEDURE — 97110 THERAPEUTIC EXERCISES: CPT | Performed by: PHYSICAL THERAPIST

## 2019-11-25 NOTE — PROGRESS NOTES
Daily Note     Today's date: 2019  Patient name: Jigna Bravo  : 1965  MRN: [id number]  Referring provider: Sha Littlejohn PA-C  Dx:   Encounter Diagnosis     ICD-10-CM    1  Primary osteoarthritis of both knees M17 0                   Subjective:  Patient states that she is feeling okay  Objective: See treatment diary below    Assessment:  Patient demonstrates hip accessory weakness throughout  No knee pain with exercise progressions  Patient education on HEP to be performed  Tolerated treatment well  Patient would benefit from continued PT  Plan: Continue per plan of care        Precautions:  HTN      Manual                                                                                  Exercise Diary              Bike  10'           Bridges 3x10 3x10 c/ ball           Clams 30x5s 30x5s           Quad sets             SLRx4  3x10           Glute sets  30x5s                        Sit <-> stand             Lateral band walks             SL balance                                       Quad str 3x30s 3x30s           Ham str 3x30s 3x30s                                                                                                          Modalities

## 2019-12-05 ENCOUNTER — OFFICE VISIT (OUTPATIENT)
Dept: PHYSICAL THERAPY | Facility: MEDICAL CENTER | Age: 54
End: 2019-12-05
Payer: COMMERCIAL

## 2019-12-05 DIAGNOSIS — M17.0 PRIMARY OSTEOARTHRITIS OF BOTH KNEES: Primary | ICD-10-CM

## 2019-12-05 PROCEDURE — 97112 NEUROMUSCULAR REEDUCATION: CPT | Performed by: PHYSICAL THERAPIST

## 2019-12-05 PROCEDURE — 97110 THERAPEUTIC EXERCISES: CPT | Performed by: PHYSICAL THERAPIST

## 2019-12-05 NOTE — PROGRESS NOTES
Daily Note     Today's date: 2019  Patient name: Tea Valdez  : 1965  MRN: [id number]  Referring provider: Preston Bruno PA-C  Dx:   Encounter Diagnosis     ICD-10-CM    1  Primary osteoarthritis of both knees M17 0                   Subjective:  Patient states that she is feeling better overall  Objective: See treatment diary below    Assessment:  Patient presents without knee pain today  Limited tx session today d/t time constraints  She demonstrates hip accessory weakness throughout  Patient education on HEP to be performed  Tolerated treatment well  Patient would benefit from continued PT  Plan: Continue per plan of care        Precautions:  HTN      Manual                                                                                 Exercise Diary              Bike  10' np          Bridges 3x10 3x10 c/ ball 3x10 c/ ball           Clams 30x5s 30x5s 30x5s          Quad sets             SLRx4  3x10 3x10          Glute sets  30x5s 30x5s                       Sit <-> stand             Lateral band walks             SL balance                                       Quad str 3x30s 3x30s 3x30s          Ham str 3x30s 3x30s 3x30s                                                                                                         Modalities

## 2019-12-12 ENCOUNTER — OFFICE VISIT (OUTPATIENT)
Dept: PHYSICAL THERAPY | Facility: MEDICAL CENTER | Age: 54
End: 2019-12-12
Payer: COMMERCIAL

## 2019-12-12 DIAGNOSIS — M17.0 PRIMARY OSTEOARTHRITIS OF BOTH KNEES: Primary | ICD-10-CM

## 2019-12-12 PROCEDURE — 97110 THERAPEUTIC EXERCISES: CPT | Performed by: PHYSICAL THERAPIST

## 2019-12-12 PROCEDURE — 97112 NEUROMUSCULAR REEDUCATION: CPT | Performed by: PHYSICAL THERAPIST

## 2019-12-12 NOTE — PROGRESS NOTES
Daily Note     Today's date: 2019  Patient name: Delmi Magdaleno  : 1965  MRN: [id number]  Referring provider: Ana Maria Johnson PA-C  Dx:   Encounter Diagnosis     ICD-10-CM    1  Primary osteoarthritis of both knees M17 0                   Subjective:  Patient states that her knees feel good  Objective: See treatment diary below    Assessment:  Patient presents without knee pain and demonstrates hip accessory weakness throughout  Patient education on HEP to be performed  Tolerated treatment well  Patient would benefit from continued PT  Plan: Continue per plan of care        Precautions:  HTN      Manual                                                                                Exercise Diary              Bike  10' np 10'         Bridges 3x10 3x10 c/ ball 3x10 c/ ball  3x10 c/ ball         Clams 30x5s 30x5s 30x5s 30x5s         Quad sets             SLRx4  3x10 3x10 home         Glute sets  30x5s 30x5s home         Wall squats    3x10         Sit <-> stand             Lateral band walks             SL balance                                       Quad str 3x30s 3x30s 3x30s 3x30s         Ham str 3x30s 3x30s 3x30s 3x30s                                                                                                        Modalities

## 2019-12-19 ENCOUNTER — APPOINTMENT (OUTPATIENT)
Dept: PHYSICAL THERAPY | Facility: MEDICAL CENTER | Age: 54
End: 2019-12-19
Payer: COMMERCIAL

## 2019-12-20 ENCOUNTER — OFFICE VISIT (OUTPATIENT)
Dept: PHYSICAL THERAPY | Facility: MEDICAL CENTER | Age: 54
End: 2019-12-20
Payer: COMMERCIAL

## 2019-12-20 DIAGNOSIS — M17.0 PRIMARY OSTEOARTHRITIS OF BOTH KNEES: Primary | ICD-10-CM

## 2019-12-20 PROCEDURE — 97110 THERAPEUTIC EXERCISES: CPT | Performed by: PHYSICAL THERAPIST

## 2019-12-20 PROCEDURE — 97112 NEUROMUSCULAR REEDUCATION: CPT | Performed by: PHYSICAL THERAPIST

## 2019-12-20 NOTE — PROGRESS NOTES
Daily Note     Today's date: 2019  Patient name: Steve Jones  : 1965  MRN: [id number]  Referring provider: Mali Elliott PA-C  Dx:   Encounter Diagnosis     ICD-10-CM    1  Primary osteoarthritis of both knees M17 0                   Subjective:  Patient states that her knees feel good  Objective: See treatment diary below    Assessment:  Patient presents without knee pain and demonstrates hip accessory weakness throughout  Patient education on HEP to be performed  Tolerated treatment well  Patient would benefit from continued PT  Plan: Continue per plan of care        Precautions:  HTN      Manual                                                                               Exercise Diary              Bike  10' np 10' 15'        Hip add      30x5s c/ ball        Bridges 3x10 3x10 c/ ball 3x10 c/ ball  3x10 c/ ball 20x c/ ball        Clams 30x5s 30x5s 30x5s 30x5s 30x5s        Quad sets             SLRx4  3x10 3x10 home 3x10        Glute sets  30x5s 30x5s home 30x5s        Wall squats    3x10 3x10        Sit <-> stand             Lateral band walks             SL balance                                       Quad str 3x30s 3x30s 3x30s 3x30s 3x30s        Ham str 3x30s 3x30s 3x30s 3x30s 3x30s                                                                                                       Modalities

## 2019-12-26 ENCOUNTER — OFFICE VISIT (OUTPATIENT)
Dept: PHYSICAL THERAPY | Facility: MEDICAL CENTER | Age: 54
End: 2019-12-26
Payer: COMMERCIAL

## 2019-12-26 DIAGNOSIS — M17.0 PRIMARY OSTEOARTHRITIS OF BOTH KNEES: Primary | ICD-10-CM

## 2019-12-26 PROCEDURE — 97112 NEUROMUSCULAR REEDUCATION: CPT | Performed by: PHYSICAL THERAPIST

## 2019-12-26 PROCEDURE — 97110 THERAPEUTIC EXERCISES: CPT | Performed by: PHYSICAL THERAPIST

## 2019-12-26 NOTE — PROGRESS NOTES
Daily Note     Today's date: 2019  Patient name: Kishan Castaneda  : 1965  MRN: [id number]  Referring provider: Michela Ivan PA-C  Dx:   Encounter Diagnosis     ICD-10-CM    1  Primary osteoarthritis of both knees M17 0                   Subjective:  Patient says that she feels good  Objective: See treatment diary below    Assessment:  Patient presents without knee pain and demonstrates hip accessory weakness throughout  Patient education on HEP to be performed  Tolerated treatment well  Patient would benefit from continued PT  Plan: Continue per plan of care        Precautions:  HTN      Manual                                                                              Exercise Diary              Bike  10' np 10' 15' 10'       Hip add      30x5s c/ ball np       Bridges 3x10 3x10 c/ ball 3x10 c/ ball  3x10 c/ ball 20x c/ ball 20x c/ ball       Clams 30x5s 30x5s 30x5s 30x5s 30x5s 30x5s       SLRx4  3x10 3x10 home 3x10 3x10       Glute sets  30x5s 30x5s home 30x5s 30x5s       Wall squats    3x10 3x10 3x12       Sit <-> stand             Lateral band walks             SL balance             LP      3x10 90#                    Quad str 3x30s 3x30s 3x30s 3x30s 3x30s 3x30s       Ham str 3x30s 3x30s 3x30s 3x30s 3x30s 3x30s                                                                                                      Modalities

## 2020-01-02 ENCOUNTER — OFFICE VISIT (OUTPATIENT)
Dept: PHYSICAL THERAPY | Facility: MEDICAL CENTER | Age: 55
End: 2020-01-02
Payer: COMMERCIAL

## 2020-01-02 DIAGNOSIS — M17.0 PRIMARY OSTEOARTHRITIS OF BOTH KNEES: Primary | ICD-10-CM

## 2020-01-02 PROCEDURE — 97110 THERAPEUTIC EXERCISES: CPT | Performed by: PHYSICAL THERAPIST

## 2020-01-02 PROCEDURE — 97112 NEUROMUSCULAR REEDUCATION: CPT | Performed by: PHYSICAL THERAPIST

## 2020-01-02 NOTE — PROGRESS NOTES
Daily Note     Today's date: 2020  Patient name: Venkat Bryant  : 1965  MRN: [id number]  Referring provider: Christianne Calix PA-C  Dx:   Encounter Diagnosis     ICD-10-CM    1  Primary osteoarthritis of both knees M17 0                   Subjective:  Patient says that she feels good  Objective: See treatment diary below  Assessment:  Patient presents without knee pain and demonstrates hip accessory weakness throughout  Patient education on HEP to be performed  Tolerated treatment well  Patient would benefit from continued PT  Plan: Continue per plan of care        Precautions:  HTN      Manual                                                                             Exercise Diary              Bike  10' np 10' 15' 10' 10'      Hip add      30x5s c/ ball np np      Bridges 3x10 3x10 c/ ball 3x10 c/ ball  3x10 c/ ball 20x c/ ball 20x c/ ball 3x10 c/ ball      Clams 30x5s 30x5s 30x5s 30x5s 30x5s 30x5s 30x5s blue      SLRx4  3x10 3x10 home 3x10 3x10 3x10 2#      Glute sets  30x5s 30x5s home 30x5s 30x5s 30x5s      Wall squats    3x10 3x10 3x12 3x15      Sit <-> stand             Lateral band walks             SL balance             LP      3x10 90# 3x12 90#                   Quad str 3x30s 3x30s 3x30s 3x30s 3x30s 3x30s 3x30s      Ham str 3x30s 3x30s 3x30s 3x30s 3x30s 3x30s 3x30s                                                                                                     Modalities

## 2020-01-08 ENCOUNTER — OFFICE VISIT (OUTPATIENT)
Dept: PHYSICAL THERAPY | Facility: MEDICAL CENTER | Age: 55
End: 2020-01-08
Payer: COMMERCIAL

## 2020-01-08 DIAGNOSIS — M17.0 PRIMARY OSTEOARTHRITIS OF BOTH KNEES: Primary | ICD-10-CM

## 2020-01-08 PROCEDURE — 97110 THERAPEUTIC EXERCISES: CPT | Performed by: PHYSICAL THERAPIST

## 2020-01-08 NOTE — PROGRESS NOTES
Daily Note     Today's date: 2020  Patient name: Madie Garcia  : 1965  MRN: [id number]  Referring provider: Lelo Muse PA-C  Dx:   Encounter Diagnosis     ICD-10-CM    1  Primary osteoarthritis of both knees M17 0                   Subjective:  Patient states that her knees feel good, but she is having a little bit of left hip pain  Objective: See treatment diary below  Assessment:  Patient demonstrates left ITB tightness  She demonstrates good tolerance to exercise progressions and is without knee pain  Tolerated treatment well  Patient would benefit from continued PT  Plan: Continue per plan of care        Precautions:  HTN      Manual                                                                            Exercise Diary              Bike  10' np 10' 15' 10' 10' 10'     Hip add      30x5s c/ ball np np      Bridges 3x10 3x10 c/ ball 3x10 c/ ball  3x10 c/ ball 20x c/ ball 20x c/ ball 3x10 c/ ball 3x10 c/ ball     Clams 30x5s 30x5s 30x5s 30x5s 30x5s 30x5s 30x5s blue 30x5s blue     SLRx4  3x10 3x10 home 3x10 3x10 3x10 2# 3x10 no wt      Glute sets  30x5s 30x5s home 30x5s 30x5s 30x5s 30x5s     Wall squats    3x10 3x10 3x12 3x15 3x15     Sit <-> stand             Lateral band walks             SL balance             LP      3x10 90# 3x12 90# 3x10 110#                  Quad str 3x30s 3x30s 3x30s 3x30s 3x30s 3x30s 3x30s 3x30s     Ham str 3x30s 3x30s 3x30s 3x30s 3x30s 3x30s 3x30s 3x30s                                                                                                    Modalities

## 2020-01-14 ENCOUNTER — OFFICE VISIT (OUTPATIENT)
Dept: OBGYN CLINIC | Facility: MEDICAL CENTER | Age: 55
End: 2020-01-14
Payer: COMMERCIAL

## 2020-01-14 VITALS
BODY MASS INDEX: 48.82 KG/M2 | HEIGHT: 65 IN | DIASTOLIC BLOOD PRESSURE: 84 MMHG | HEART RATE: 69 BPM | SYSTOLIC BLOOD PRESSURE: 136 MMHG | WEIGHT: 293 LBS

## 2020-01-14 DIAGNOSIS — M17.0 PRIMARY OSTEOARTHRITIS OF BOTH KNEES: Primary | ICD-10-CM

## 2020-01-14 PROCEDURE — 99213 OFFICE O/P EST LOW 20 MIN: CPT | Performed by: ORTHOPAEDIC SURGERY

## 2020-01-14 PROCEDURE — 20610 DRAIN/INJ JOINT/BURSA W/O US: CPT | Performed by: ORTHOPAEDIC SURGERY

## 2020-01-14 RX ORDER — METHYLPREDNISOLONE ACETATE 40 MG/ML
2 INJECTION, SUSPENSION INTRA-ARTICULAR; INTRALESIONAL; INTRAMUSCULAR; SOFT TISSUE
Status: COMPLETED | OUTPATIENT
Start: 2020-01-14 | End: 2020-01-14

## 2020-01-14 RX ORDER — BUPIVACAINE HYDROCHLORIDE 2.5 MG/ML
4 INJECTION, SOLUTION INFILTRATION; PERINEURAL
Status: COMPLETED | OUTPATIENT
Start: 2020-01-14 | End: 2020-01-14

## 2020-01-14 RX ADMIN — METHYLPREDNISOLONE ACETATE 2 ML: 40 INJECTION, SUSPENSION INTRA-ARTICULAR; INTRALESIONAL; INTRAMUSCULAR; SOFT TISSUE at 16:16

## 2020-01-14 RX ADMIN — BUPIVACAINE HYDROCHLORIDE 4 ML: 2.5 INJECTION, SOLUTION INFILTRATION; PERINEURAL at 16:16

## 2020-01-14 NOTE — PROGRESS NOTES
Orthopaedic Surgery - Office Note  Emilia Pastrana (89 y o  female)   : 1965   MRN: [id number]  Encounter Date: 2020    Chief Complaint   Patient presents with    Left Knee - Follow-up    Right Knee - Follow-up       Assessment / Plan  Bilateral knee osteoarthritis    · CSI of bilateral knee joint was performed   · Continue outpatient physical therapy, transition home exercise program  · New referral Rheumatology was placed today, encourage she follows root with this due to her elevated levels  · Activities as tolerated, weight bear as tolerated  Return in about 8 weeks (around 3/10/2020)  History of Present Illness  Emilia Pastrana is a 48 y o  female who presents for follow-up regarding bilateral knee pain  She states that her last visit she has been attending physical therapy and getting some relief  Her pain began decreased when she feels they increased her activity level at therapy  She denies any new injury or trauma  Her pain continues to localize over the anterior aspect of both knees  She feels that the right is worse than the left  She does mention that she has had Visco injections in the past but has not had steroid injections at this time  She would like to try these today  She also has not taken her referral to Rheumatology to follow-up regarding her elevated CRP and sed rate  Review of Systems  Pertinent items are noted in HPI  All other systems were reviewed and are negative  Physical Exam  /84   Pulse 69   Ht 5' 5" (1 651 m)   Wt (!) 140 kg (308 lb)   BMI 51 25 kg/m²   Cons: Appears well  No apparent distress  Psych: Alert  Oriented x3  Mood and affect normal   Eyes: PERRLA, EOMI  Resp: Normal effort  No audible wheezing or stridor  CV: Palpable pulse  No discernable arrhythmia  No LE edema  Lymph:  No palpable cervical, axillary, or inguinal lymphadenopathy  Skin: Warm  No palpable masses  No visible lesions  Neuro: Normal muscle tone    Normal and symmetric DTR's  Bilateral Knee Exam  Alignment:  Normal knee alignment  Inspection:  No swelling  No deformity  Palpation:  Difuse anterior tenderness  ROM:  Normal knee ROM  Strength:  5/5 hip flexors and abductors  5/5 quadriceps and hamstrings  Stability:  No objective knee instability  Stable Varus / Valgus stress, Lachman, and Posterior drawer  Tests:  (-) Diego  Patella:  Patella tracks centrally with crepitus  Neurovascular:  Sensation intact in DP/SP/Duval/Sa/T nerve distributions  2+ DP & PT pulses  Brisk capillary refill in all toes  Toes warm and perfused  Gait:  Steady  Studies Reviewed  No studies to review    Large joint arthrocentesis: bilateral knee  Date/Time: 1/14/2020 4:16 PM  Consent given by: patient  Site marked: site marked  Timeout: Immediately prior to procedure a time out was called to verify the correct patient, procedure, equipment, support staff and site/side marked as required   Supporting Documentation  Indications: pain   Procedure Details  Location: knee - bilateral knee  Preparation: Patient was prepped and draped in the usual sterile fashion  Ultrasound guidance: no  Approach: anterolateral    Medications (Right): 4 mL bupivacaine 0 25 %; 2 mL methylPREDNISolone acetate 40 mg/mLMedications (Left): 4 mL bupivacaine 0 25 %; 2 mL methylPREDNISolone acetate 40 mg/mL   Patient tolerance: patient tolerated the procedure well with no immediate complications  Dressing:  Sterile dressing applied             Medical, Surgical, Family, and Social History  The patient's medical history, family history, and social history, were reviewed and updated as appropriate      Past Medical History:   Diagnosis Date    Constipation     Hay fever     Pulmonary embolism [facility])        Past Surgical History:   Procedure Laterality Date    HYSTERECTOMY  2013    MYOMECTOMY  1994    REDUCTION MAMMAPLASTY  2011       Family History   Problem Relation Age of Onset    Hypertension Mother  Rheum arthritis Mother     Prostate cancer Father     Hypertension Father     Heart attack Father     Glaucoma Father     Hypertension Maternal Grandmother     Stroke Maternal Grandmother     Heart attack Paternal Grandmother        Social History     Occupational History    Not on file   Tobacco Use    Smoking status: Never Smoker    Smokeless tobacco: Never Used   Substance and Sexual Activity    Alcohol use: Yes     Comment: socially    Drug use: No    Sexual activity: Not on file       Allergies   Allergen Reactions    Seasonal Ic  [Cholestatin]          Current Outpatient Medications:     ALPRAZolam (XANAX) 0 5 mg tablet, Take 1 tablet (0 5 mg total) by mouth daily at bedtime as needed for anxiety, Disp: 20 tablet, Rfl: 1    ergocalciferol (VITAMIN D2) 50,000 units, TAKE ONE CAPSULE BY MOUTH ONE TIME PER WEEK, Disp: 12 capsule, Rfl: 0    hydrochlorothiazide (HYDRODIURIL) 25 mg tablet, Take 25 mg by mouth, Disp: , Rfl:     montelukast (SINGULAIR) 10 mg tablet, TAKE 1 TABLET BY MOUTH EVERY DAY, Disp: 30 tablet, Rfl: 0    SUMAtriptan (IMITREX) 100 mg tablet, Take 1 tablet (100 mg total) by mouth once as needed for migraine for up to 1 dose Pt requests BRAND NECCESSARY, Disp: 10 tablet, Rfl: 1      Brian Frye MA    Scribe Attestation    I,:   Brian Frye MA am acting as a scribe while in the presence of the attending physician :        I,:   Mike Agustin MD personally performed the services described in this documentation    as scribed in my presence :

## 2020-01-15 ENCOUNTER — TELEPHONE (OUTPATIENT)
Dept: SLEEP CENTER | Facility: CLINIC | Age: 55
End: 2020-01-15

## 2020-01-15 ENCOUNTER — TELEPHONE (OUTPATIENT)
Dept: OBGYN CLINIC | Facility: HOSPITAL | Age: 55
End: 2020-01-15

## 2020-01-15 DIAGNOSIS — G47.33 OBSTRUCTIVE SLEEP APNEA SYNDROME: Primary | ICD-10-CM

## 2020-01-15 DIAGNOSIS — M17.0 PRIMARY OSTEOARTHRITIS OF BOTH KNEES: Primary | ICD-10-CM

## 2020-01-15 NOTE — TELEPHONE ENCOUNTER
Patient was having mask issues but just changed to a new mask  She now feels like there is not enough pressure, she's waking up a lot during night & is not getting a good night's sleep   Compliance report attached  Please advise

## 2020-01-15 NOTE — TELEPHONE ENCOUNTER
I spoke with patient and advised that this happens after steroid injections as we irritate the tissues with injection  Advised that she should ice 20 min on 20 min off, elevation above the heart, Ibuprofen 600mg TID with food and tylenol 1000mg TID for pain PRN, cane to offload okay, gentle ROM  Call office if symptoms do not improve in a week  May take 1-2 weeks for steroid to decrease inflammation in the knees  Patient verbalized understanding

## 2020-01-15 NOTE — TELEPHONE ENCOUNTER
Patient sees Dr Lam Left  She had bilat knee injections yesterday and is in increased pain  She wanted to know if that as typical for the injections and what she can do for the pain          CB: [phone]

## 2020-01-15 NOTE — TELEPHONE ENCOUNTER
I did leave a message for the patient on voicemail reiterating which Lisa Smith had said and left a call back number if she has any further questions

## 2020-01-16 NOTE — TELEPHONE ENCOUNTER
AHI is within good range, however pressure can be increased if she is not feeling like it is enough  Pressure change to increase to 10cm has been ordered and will need to be faxed to [facility]  She has a follow up compliance visit on 2/4/20  If she is still having difficulty, she should call with an update for further adjustments or to [facility] if mask is causing difficulty because she is only at 25% compliance over 4 hours per day and she needs to be at 70%

## 2020-01-16 NOTE — TELEPHONE ENCOUNTER
Discussed Wilma's recommendations- patient reports she didn't use CPAP for 1 week because she was waiting for [facility] to replace her mask   She will call back with any further concerns

## 2020-01-16 NOTE — TELEPHONE ENCOUNTER
Left message for the patient that RX for pressure change faxed to [facility]  Asked patient to call back to discuss DAMON Souza's MAUDE concern about her compliance

## 2020-02-04 ENCOUNTER — OFFICE VISIT (OUTPATIENT)
Dept: SLEEP CENTER | Facility: CLINIC | Age: 55
End: 2020-02-04
Payer: COMMERCIAL

## 2020-02-04 VITALS — BODY MASS INDEX: 48.82 KG/M2 | HEIGHT: 65 IN | WEIGHT: 293 LBS

## 2020-02-04 DIAGNOSIS — G47.33 OBSTRUCTIVE SLEEP APNEA TREATED WITH CONTINUOUS POSITIVE AIRWAY PRESSURE (CPAP): Primary | ICD-10-CM

## 2020-02-04 DIAGNOSIS — E66.01 MORBID OBESITY (HCC): ICD-10-CM

## 2020-02-04 DIAGNOSIS — Z99.89 OBSTRUCTIVE SLEEP APNEA TREATED WITH CONTINUOUS POSITIVE AIRWAY PRESSURE (CPAP): Primary | ICD-10-CM

## 2020-02-04 PROCEDURE — 99214 OFFICE O/P EST MOD 30 MIN: CPT | Performed by: NURSE PRACTITIONER

## 2020-02-04 PROCEDURE — 3075F SYST BP GE 130 - 139MM HG: CPT | Performed by: NURSE PRACTITIONER

## 2020-02-04 PROCEDURE — 3079F DIAST BP 80-89 MM HG: CPT | Performed by: NURSE PRACTITIONER

## 2020-02-04 PROCEDURE — 1036F TOBACCO NON-USER: CPT | Performed by: NURSE PRACTITIONER

## 2020-02-04 NOTE — PATIENT INSTRUCTIONS
1   Continue use of CPAP equipment nightly  2  Continue to clean your equipment, as discussed  3  Contact the Sleep [address] [place] [address] with any questions or concerns prior to your next visit, as needed  4  Schedule visit for follow-up in 6 months  5   You may consider trying the ResMed F-30 full face mask  6  Change setting to heat of 1 and humidity of 3, if too dry, increase humidity to 4  7  You may consider looking into a CPAP pillow  8  Consider Xylimelts for oral dryness, saline nasal spray for nasal dryness      Nursing Support:  When: Monday through Friday 7A-5PM except holidays  Where: Our direct line is [phone]  If you are having a true emergency please call 911  In the event that the line is busy or it is after hours please leave a voice message and we will return your call  Please speak clearly, leaving your full name, birth date, best number to reach you and the reason for your call  Medication refills: We will need the name of the medication, the dosage, the ordering provider, whether you get a 30 or 90 day refill, and the pharmacy name and address  Medications will be ordered by the provider only  Nurses cannot call in prescriptions  Please allow 7 days for medication refills  Physician requested updates: If your provider requested that you call with an update after starting medication, please be ready to provide us the medication and dosage, what time you take your medication, the time you attempt to fall asleep, time you fall asleep, when you wake up, and what time you get out of bed  Sleep Study Results: We will contact you with sleep study results and/or next steps after the physician has reviewed your testing

## 2020-02-04 NOTE — PROGRESS NOTES
Progress Note - [address] 48 y o  female   :1965, MRN: [id number]  2020          Follow Up Evaluation / Problem: Moderate to Severe Obstructive Sleep Apnea  Morbid Obesity      Thank you for the opportunity of participating in the evaluation and care of this patient in the Sleep Clinic at [facility]  HPI: Daisy Curtis is a 48y o  year old female  The patient presents for follow up of moderate to severe obstructive sleep apnea  She had used CPAP approximately 8-9 years ago, but had difficulty using the equipment  Symptoms persisted, including daytime sleepiness, loud snoring interruptions in sleep, difficulty initiating sleep and problems with concentration  She completed a split night sleep study in May 2019 and was eventually set up with CPAP equipment in November  She is here to review compliance and effectiveness of treatment      Review of Systems      Genitourinary need to urinate more than twice a night   Cardiology none   Gastrointestinal frequent heartburn/acid reflux   Neurology awaken with headache, forgetfulness and poor concentration or confusion,    Constitutional fatigue   Integumentary none   Psychiatry none   Musculoskeletal joint pain, muscle aches and back pain   Pulmonary shortness of breath with activity   ENT none   Endocrine none   Hematological none       Current Outpatient Medications:     ALPRAZolam (XANAX) 0 5 mg tablet, Take 1 tablet (0 5 mg total) by mouth daily at bedtime as needed for anxiety, Disp: 20 tablet, Rfl: 1    ergocalciferol (VITAMIN D2) 50,000 units, TAKE ONE CAPSULE BY MOUTH ONE TIME PER WEEK, Disp: 12 capsule, Rfl: 0    hydrochlorothiazide (HYDRODIURIL) 25 mg tablet, Take 25 mg by mouth, Disp: , Rfl:     montelukast (SINGULAIR) 10 mg tablet, TAKE 1 TABLET BY MOUTH EVERY DAY, Disp: 30 tablet, Rfl: 0    SUMAtriptan (IMITREX) 100 mg tablet, Take 1 tablet (100 mg total) by mouth once as needed for migraine for up to 1 dose Pt requests BRAND NECCESSARY, Disp: 10 tablet, Rfl: 1    [place] Sleepiness Scale  Sitting and reading: Moderate chance of dozing  Watching TV: Moderate chance of dozing  Sitting, inactive in a public place (e g  a theatre or a meeting): Slight chance of dozing  As a passenger in a car for an hour without a break: Slight chance of dozing  Lying down to rest in the afternoon when circumstances permit: Moderate chance of dozing  Sitting and talking to someone: Would never doze  Sitting quietly after a lunch without alcohol: Would never doze  In a car, while stopped for a few minutes in traffic: Would never doze  Total score: 8              Vitals:    02/04/20 1500   Weight: (!) 140 kg (308 lb)   Height: 5' 5" (1 651 m)       Body mass index is 51 25 kg/m²  Neck Circumference: 17 5       EPWORTH SLEEPINESS SCORE  Total score: 8      Past History Since Last Sleep Center Visit:   She had some difficulties with mask usage at the start of treatment, including air leaks and gapping of the mask with positional changes in sleep  She contacted Misbah Quach and needed to wait to receive a new mask  Once she received the mask, she felt like she wasn't having enough pressure  A pressure change was made to increase to 10cm  She felt better with the increase in pressure and mask  She still has some minimal air leaks, particularly with lateral positioning  The patient reports that she cleans the equipment appropriately      The review of systems and following portions of the patient's history were reviewed and updated as appropriate: allergies, current medications, past family history, past medical history, past social history, past surgical history, and problem list         OBJECTIVE    PAP Pressure: Nasal CPAP set to deliver 10 cm of water pressure  Type of mask used: full face  DME Provider: [facility]    Physical Exam: General Appearance:   Alert, cooperative, no distress, appears stated age, obese     Head:   Normocephalic, without obvious abnormality, atraumatic     Eyes:   PERRL, conjunctiva/corneas clear, EOM's intact          Nose:  Nares normal, septum midline, no drainage or sinus tenderness           Throat:  Lips, teeth and gums normal; tongue normal size and  shape and midline        Neck:  Supple, symmetrical, trachea midline, no adenopathy; Thyroid: No enlargement, tenderness or nodules; no carotid bruit or JVD     Lungs:      Clear to auscultation bilaterally, respirations unlabored     Heart:   Regular rate and rhythm, S1 and S2 normal, no murmur, rub or gallop       Extremities:  Extremities normal, atraumatic, no cyanosis or edema       Skin:  Skin color, texture, turgor normal, no rashes or lesions       Neurologic:  No focal deficits noted       ASSESSMENT / PLAN    1  Obstructive sleep apnea treated with continuous positive airway pressure (CPAP)  PAP DME Resupply/Reorder           Counseling / Coordination of Care  Total clinic time spent today 35 minutes  Greater than 50% of total time was spent with the patient and / or family counseling and / or coordination of care  A description of the counseling / coordination of care:     Impressions, Diagnostic results, Prognosis, Instructions for management, Risks and benefits of treatment, Patient and family education, Risk factor reductions and Importance of compliance with treatment    Today I reviewed the patient's compliance data for the first 60 days of her PAP treatment  she has been able to use the equipment 76 7% of all days recorded  Average usage was 4 or more hours 43 3% of all days recorded  The estimated AHI is 3 4 abnormal breathing events per hour  The patient feels they benefit from the use of PAP equipment and would like to continue PAP therapy  Response to treatment has been good    She does not feel that her sleepiness has improved significantly despite the improvement from an [employer] of 11 at her initial visit to 8 today  She was encouraged to meet with Jeronimo Anaya for a mask fitting  The ResMed F-30 was recommended  When she feels the mask is fitting appropriately, we may consider APAP if she does not feel the pressure is comfortable  We also discussed changing the heat and humidity setting for better comfort  We also discussed the use of Xylimelts for oral dryness  We also discussed considering the use of a CPAP pillow, which will avoid the movement of the mask and disruption of the seal when sleeping laterally  She will continue using this equipment at the settings noted above for the next 6 months  At that timeshe will then return for a routine follow-up evaluation  I have asked the patient to contact the Sleep [address] [facility] if she encounters any difficulties prior to that time  The following instructions have been given to the patient today:    Patient Instructions   1  Continue use of CPAP equipment nightly  2  Continue to clean your equipment, as discussed  3  Contact the Sleep [facility] with any questions or concerns prior to your next visit, as needed  4  Schedule visit for follow-up in 6 months  5   You may consider trying the ResMed F-30 full face mask  6  Change setting to heat of 1 and humidity of 3, if too dry, increase humidity to 4  7  You may consider looking into a CPAP pillow  8  Consider Xylimelts for oral dryness, saline nasal spray for nasal dryness      Nursing Support:  When: Monday through Friday 7A-5PM except holidays  Where: Our direct line is [phone]  If you are having a true emergency please call 911  In the event that the line is busy or it is after hours please leave a voice message and we will return your call  Please speak clearly, leaving your full name, birth date, best number to reach you and the reason for your call  Medication refills:  We will need the name of the medication, the dosage, the ordering provider, whether you get a 30 or 90 day refill, and the pharmacy name and address  Medications will be ordered by the provider only  Nurses cannot call in prescriptions  Please allow 7 days for medication refills  Physician requested updates: If your provider requested that you call with an update after starting medication, please be ready to provide us the medication and dosage, what time you take your medication, the time you attempt to fall asleep, time you fall asleep, when you wake up, and what time you get out of bed  Sleep Study Results: We will contact you with sleep study results and/or next steps after the physician has reviewed your testing        [place] Whipple, [address] [facility]

## 2020-02-05 ENCOUNTER — TELEPHONE (OUTPATIENT)
Dept: SLEEP CENTER | Facility: CLINIC | Age: 55
End: 2020-02-05

## 2020-03-19 ENCOUNTER — TELEPHONE (OUTPATIENT)
Dept: OBGYN CLINIC | Facility: MEDICAL CENTER | Age: 55
End: 2020-03-19

## 2020-03-19 NOTE — TELEPHONE ENCOUNTER
Left voicemail for patient to call back to re-schedule appointment  Per Dr Hailey Isabel schedule patient in two weeks

## 2020-06-29 ENCOUNTER — EVALUATION (OUTPATIENT)
Dept: PHYSICAL THERAPY | Facility: CLINIC | Age: 55
End: 2020-06-29
Payer: COMMERCIAL

## 2020-06-29 DIAGNOSIS — M67.873 OTHER SPECIFIED DISORDERS OF TENDON, RIGHT ANKLE AND FOOT: Primary | ICD-10-CM

## 2020-06-29 PROCEDURE — 97161 PT EVAL LOW COMPLEX 20 MIN: CPT | Performed by: PHYSICAL THERAPIST

## 2020-06-29 PROCEDURE — 97110 THERAPEUTIC EXERCISES: CPT | Performed by: PHYSICAL THERAPIST

## 2020-07-02 ENCOUNTER — TRANSCRIBE ORDERS (OUTPATIENT)
Dept: PHYSICAL THERAPY | Facility: CLINIC | Age: 55
End: 2020-07-02

## 2020-07-02 DIAGNOSIS — M67.873 OTHER SPECIFIED DISORDERS OF TENDON, RIGHT ANKLE AND FOOT: Primary | ICD-10-CM

## 2020-07-06 ENCOUNTER — OFFICE VISIT (OUTPATIENT)
Dept: PHYSICAL THERAPY | Facility: CLINIC | Age: 55
End: 2020-07-06
Payer: COMMERCIAL

## 2020-07-06 DIAGNOSIS — M67.873 OTHER SPECIFIED DISORDERS OF TENDON, RIGHT ANKLE AND FOOT: Primary | ICD-10-CM

## 2020-07-06 PROCEDURE — 97110 THERAPEUTIC EXERCISES: CPT

## 2020-07-06 PROCEDURE — 97140 MANUAL THERAPY 1/> REGIONS: CPT

## 2020-07-06 NOTE — PROGRESS NOTES
Daily Note     Today's date: 2020  Patient name: Primitivo Bauer  : 1965  MRN: [id number]  Referring provider: Aisha Mckeon DPM  Dx:   Encounter Diagnosis     ICD-10-CM    1  Other specified disorders of tendon, right ankle and foot M67 873        Start Time:   Stop Time: 170  Total time in clinic (min): 48 minutes    Subjective: Pt reports having increased pain/soreness while performing eccentric calf strengthening at home  States she has reduced her HEP to every other day due to this increase in symptoms  Objective: See treatment diary below  Evaluating PT, Jah Vieira, encouraged pt to make sure HEP is performed low less than every other day  Assessment: Tolerated treatment well  Pt was challenged with multihip abd/flex this visit, demonstrating hip abd weakness R>L  While performing eccentric R calf strengthening, pt's weight shift tended to cause lateral roll of R ankle, creating excessive INV  This misplaced stress on R ankle musculature may have been causing pt's increased pain while performing this movement at home  Pt was able to self correct form once verbal and tactile cues provided  Standing soleus stretch was attempted, but pt felt more pressure along anterior aspect of R ankle than a stretch  Moderate soft tissue restriction noted along R achilles tendon during manual transverse friction  Patient would benefit from continued PT to further improve strength and maximize function  Plan: Continue per plan of care  Monitor response to initial treatment as noted  Precautions: HTN, history of PE        Manuals 20 7           Transverse friction R achilles [employer]                                                  Neuro Re-Ed                                                                                                        Ther Ex             Standing gastrocnemius stretch NV 30"x3           Standing Soleus stretch NV attempted           Leg Press  (sled 7) NV 55#x10  75#x10           Eccentric calf (R) strengthening NV x10           Multiphip flexion (do bilat) NV 25#  2x10 ea           Multiphip ABD (do bilat) NV 25#  2x10 ea                                     Ther Activity                                       Gait Training                                       Modalities             Iontophoresis with dexamethasone R achilles, wrapped + side medially 80 ma/min  RG 80 ma/min  AFB

## 2020-07-10 ENCOUNTER — OFFICE VISIT (OUTPATIENT)
Dept: PHYSICAL THERAPY | Facility: CLINIC | Age: 55
End: 2020-07-10
Payer: COMMERCIAL

## 2020-07-10 DIAGNOSIS — M67.873 OTHER SPECIFIED DISORDERS OF TENDON, RIGHT ANKLE AND FOOT: Primary | ICD-10-CM

## 2020-07-10 PROCEDURE — 97110 THERAPEUTIC EXERCISES: CPT | Performed by: PHYSICAL THERAPIST

## 2020-07-10 PROCEDURE — 97140 MANUAL THERAPY 1/> REGIONS: CPT | Performed by: PHYSICAL THERAPIST

## 2020-07-10 NOTE — PROGRESS NOTES
Daily Note     Today's date: 7/10/2020  Patient name: Primitivo Bauer  : 1965  MRN: [id number]  Referring provider: Aisha Mckeon DPM  Dx:   Encounter Diagnosis     ICD-10-CM    1  Other specified disorders of tendon, right ankle and foot M67 873                   Subjective: Has been doing eccentric calf raises with modified form as instructed in last visit, not having as much pain  Objective: See treatment diary below  Progressed LE strengthening program     Tender to palpation R achilles tendon  Assessment:     Plan: Continue per plan of care  Precautions: HTN, history of PE        Manuals 20 7/10          Transverse friction R achilles [employer] RG                                                 Neuro Re-Ed                                                                                                        Ther Ex             Standing gastrocnemius stretch NV 30"x3 30sec x 3          Standing Soleus stretch NV attempted D/C          Leg Press  (sled 7) NV 55#x10  75#x10 75#  3 x 10          Eccentric calf (R) strengthening NV x10 2 x 10          Multiphip flexion (do bilat) NV 25#  2x10 ea 25#  2 x 15          Multiphip ABD (do bilat) NV 25#  2x10 ea 25#  2 x 10          Achilles stretch off of step   NV          Mini squats   NV          Ther Activity                                       Gait Training                                       Modalities             Iontophoresis with dexamethasone R achilles, wrapped + side medially 80 ma/min  RG 80 ma/min  AFB 80 ma min

## 2020-07-13 ENCOUNTER — OFFICE VISIT (OUTPATIENT)
Dept: PHYSICAL THERAPY | Facility: CLINIC | Age: 55
End: 2020-07-13
Payer: COMMERCIAL

## 2020-07-13 DIAGNOSIS — M67.873 OTHER SPECIFIED DISORDERS OF TENDON, RIGHT ANKLE AND FOOT: Primary | ICD-10-CM

## 2020-07-13 PROCEDURE — 97110 THERAPEUTIC EXERCISES: CPT | Performed by: PHYSICAL THERAPIST

## 2020-07-13 PROCEDURE — 97140 MANUAL THERAPY 1/> REGIONS: CPT | Performed by: PHYSICAL THERAPIST

## 2020-07-14 NOTE — PROGRESS NOTES
Daily Note     Today's date: 2020  Patient name: Herminio Dawson  : 1965  MRN: [id number]  Referring provider: Michele Luna DPM  Dx:   Encounter Diagnosis     ICD-10-CM    1  Other specified disorders of tendon, right ankle and foot M67 873                   Subjective: Noting improvement in status  Less achilles and knee pain over the weekend  Denies pain upon entry into clinic today  Objective: See treatment diary below  Progressed LE stretching and strengthening program     Tender to palpation R achilles tendon  Assessment:     Plan: Continue per plan of care  Precautions: HTN, history of PE        Manuals 6/29/20 7/6 7/10 7/13         Transverse friction R achilles [employer] RG RG         Manual R calf stretch    RG         Posterior talar glide (III)    3 min                      Neuro Re-Ed                                                                                                        Ther Ex             Standing gastrocnemius stretch NV 30"x3 30sec x 3 30sec x 3         Standing Soleus stretch NV attempted D/C          Leg Press  (sled 7) NV 55#x10  75#x10 75#  3 x 10 75#  3 x 10         Eccentric calf (R) strengthening NV x10 2 x 10 2 x 10         Multiphip flexion (do bilat) NV 25#  2x10 ea 25#  2 x 15 25#  2 x 15         Multiphip ABD (do bilat) NV 25#  2x10 ea 25#  2 x 10 25#  2 x 15         Achilles stretch off of step   NV 30sec x 3         Mini squats   NV 2 x 10         Ther Activity                                       Gait Training                                       Modalities             Iontophoresis with dexamethasone R achilles, wrapped + side medially 80 ma/min  RG 80 ma/min  AFB 80 ma min 80 ma min

## 2020-07-17 ENCOUNTER — OFFICE VISIT (OUTPATIENT)
Dept: PHYSICAL THERAPY | Facility: CLINIC | Age: 55
End: 2020-07-17
Payer: COMMERCIAL

## 2020-07-17 DIAGNOSIS — M67.873 OTHER SPECIFIED DISORDERS OF TENDON, RIGHT ANKLE AND FOOT: Primary | ICD-10-CM

## 2020-07-17 PROCEDURE — 97110 THERAPEUTIC EXERCISES: CPT

## 2020-07-17 PROCEDURE — 97140 MANUAL THERAPY 1/> REGIONS: CPT

## 2020-07-17 NOTE — PROGRESS NOTES
Daily Note     Today's date: 2020  Patient name: Adam Cassidy  : 1965  MRN: [id number]  Referring provider: Luzmaria Rivera DPM  Dx:   Encounter Diagnosis     ICD-10-CM    1  Other specified disorders of tendon, right ankle and foot M67 873            1:1 with PTA CR 2:25- 3:25  Subjective: B/L knee pain x2 days following last session  Foot and ankle feel " okay "  Objective: See treatment diary below  Progressed LE stretching and strengthening program         Assessment: Able to complete full POC despite reports of knee pain followng last session  Instructed to limit range to comfort with squats  Significant tone along achilles and into gastrocsoleus  Plan: Continue per plan of care  Precautions: HTN, history of PE        Manuals 6/29/20 7/6 7/10 7/13 7/17        Transverse friction R a[employer]illes [employer] RG RG CR  10 mins        Manual R calf stretch    RG CR  5 mins        Posterior talar glide (III)    3 min NV                     Neuro Re-Ed                                                                                                        Ther Ex             Standing gastrocnemius stretch NV 30"x3 30sec x 3 30sec x 3 30"x3        Standing Soleus stretch NV attempted D/C          Leg Press  (sled 7) NV 55#x10  75#x10 75#  3 x 10 75#  3 x 10 75#  3x10        Eccentric calf (R) strengthening NV x10 2 x 10 2 x 10 2x10        Multiphip flexion (do bilat) NV 25#  2x10 ea 25#  2 x 15 25#  2 x 15 25#  2x15        Multiphip ABD (do bilat) NV 25#  2x10 ea 25#  2 x 10 25#  2 x 15 25#  2x15        Achilles stretch off of step   NV 30sec x 3 30"x3        Mini squats   NV 2 x 10 2x10        Ther Activity                                       Gait Training                                       Modalities             Iontophoresis with dexamethasone R achilles, wrapped + side medially 80 ma/min  RG 80 ma/min  AFB 80 ma min 80 ma min 80 ma  min

## 2020-07-20 ENCOUNTER — OFFICE VISIT (OUTPATIENT)
Dept: PHYSICAL THERAPY | Facility: CLINIC | Age: 55
End: 2020-07-20
Payer: COMMERCIAL

## 2020-07-20 DIAGNOSIS — M67.873 OTHER SPECIFIED DISORDERS OF TENDON, RIGHT ANKLE AND FOOT: Primary | ICD-10-CM

## 2020-07-20 PROCEDURE — 97140 MANUAL THERAPY 1/> REGIONS: CPT | Performed by: PHYSICAL THERAPIST

## 2020-07-20 PROCEDURE — 97110 THERAPEUTIC EXERCISES: CPT | Performed by: PHYSICAL THERAPIST

## 2020-07-20 NOTE — PROGRESS NOTES
Daily Note     Today's date: 2020  Patient name: Caitie Elizabeth  : 1965  MRN: [id number]  Referring provider: Primitivo Brown DPM  Dx:   Encounter Diagnosis     ICD-10-CM    1  Other specified disorders of tendon, right ankle and foot M67 873             Subjective: Bilat knees and R achilles more sore since car ride to/from [place] over the weekend  Objective: See treatment diary below  Progressed LE stretching and strengthening program         Assessment: Therex tolerance fairly low  Needs to continue to build functional strength,      Plan: Continue per plan of care  Precautions: HTN, history of PE        Manuals 6/29/20 7/6 7/10 7/13 7/17 7/20       Transverse friction R achilles [employer] RG RG CR  10 mins RG       Manual R calf stretch    RG CR  5 mins RG       Posterior talar glide (III)    3 min NV RG                    Neuro Re-Ed                                                                                                        Ther Ex             Standing gastrocnemius stretch NV 30"x3 30sec x 3 30sec x 3 30"x3 30sec x 3       Standing Soleus stretch NV attempted D/C          Leg Press  (sled 7) NV 55#x10  75#x10 75#  3 x 10 75#  3 x 10 75#  3x10 75#  3 x 12       Eccentric calf (R) strengthening NV x10 2 x 10 2 x 10 2x10 2 x 10 off of step       Multiphip flexion (do bilat) NV 25#  2x10 ea 25#  2 x 15 25#  2 x 15 25#  2x15 25#  2 x 15       Multiphip ABD (do bilat) NV 25#  2x10 ea 25#  2 x 10 25#  2 x 15 25#  2x15 25#  2 x 15       Achilles stretch off of step   NV 30sec x 3 30"x3 30sec x 3       Mini squats   NV 2 x 10 2x10 2 x 10 with ball squeeze       Ther Activity                                       Gait Training                                       Modalities             Iontophoresis with dexamethasone R achilles, wrapped + side medially 80 ma/min  RG 80 ma/min  AFB 80 ma min 80 ma min 80 ma  min 80 ma min

## 2020-07-24 ENCOUNTER — OFFICE VISIT (OUTPATIENT)
Dept: PHYSICAL THERAPY | Facility: CLINIC | Age: 55
End: 2020-07-24
Payer: COMMERCIAL

## 2020-07-24 DIAGNOSIS — M67.873 OTHER SPECIFIED DISORDERS OF TENDON, RIGHT ANKLE AND FOOT: Primary | ICD-10-CM

## 2020-07-24 PROCEDURE — 97110 THERAPEUTIC EXERCISES: CPT | Performed by: PHYSICAL THERAPIST

## 2020-07-24 PROCEDURE — 97140 MANUAL THERAPY 1/> REGIONS: CPT | Performed by: PHYSICAL THERAPIST

## 2020-07-24 NOTE — PROGRESS NOTES
Daily Note     Today's date: 2020  Patient name: Ailin Coello  : 1965  MRN: [id number]  Referring provider: Ayse Garcia DPM  Dx:   Encounter Diagnosis     ICD-10-CM    1  Other specified disorders of tendon, right ankle and foot M67 873             Subjective: Reports tightness in both feet getting out of bed the past two days  Achilles pain 1/10 today  Objective: See treatment diary below  Progressed LE stretching and strengthening program         Assessment: Achilles pain decreasing  May have some plantar fascia tightness/irritation leading to recent AM symptoms  Plan: Continue per plan of care  Precautions: HTN, history of PE        Manuals 6/29/20 7/6 7/10 7/13 7/17 7/20 7/24      Transverse friction R achilles [employer] RG RG CR  10 mins RG RG      Manual R calf stretch    RG CR  5 mins RG RG      Posterior talar glide (III)    3 min NV RG NP      STM R plantar fascia       5 min   RG      Neuro Re-Ed                                                                                                        Ther Ex             Standing gastrocnemius stretch NV 30"x3 30sec x 3 30sec x 3 30"x3 30sec x 3 30sec x 3      Standing Soleus stretch NV attempted D/C          Leg Press  (sled 7) NV 55#x10  75#x10 75#  3 x 10 75#  3 x 10 75#  3x10 75#  3 x 12 75#  3 x 12      Eccentric calf (R) strengthening NV x10 2 x 10 2 x 10 2x10 2 x 10 off of step 2 x 10 off of step      Multiphip flexion (do bilat) NV 25#  2x10 ea 25#  2 x 15 25#  2 x 15 25#  2x15 25#  2 x 15 25#  2 x 15      Multiphip ABD (do bilat) NV 25#  2x10 ea 25#  2 x 10 25#  2 x 15 25#  2x15 25#  2 x 15 25#  2 x 15      Achilles stretch off of step   NV 30sec x 3 30"x3 30sec x 3 30sec x 3      Mini squats   NV 2 x 10 2x10 2 x 10 with ball squeeze 2 x 10 with ball squeeze      Ther Activity                                       Gait Training                                       Modalities             Iontophoresis with dexamethasone R achilles, wrapped + side medially 80 ma/min  RG 80 ma/min  AFB 80 ma min 80 ma min 80 ma  min 80 ma min 80 min min

## 2020-07-27 ENCOUNTER — APPOINTMENT (OUTPATIENT)
Dept: PHYSICAL THERAPY | Facility: CLINIC | Age: 55
End: 2020-07-27
Payer: COMMERCIAL

## 2020-07-31 ENCOUNTER — OFFICE VISIT (OUTPATIENT)
Dept: PHYSICAL THERAPY | Facility: CLINIC | Age: 55
End: 2020-07-31
Payer: COMMERCIAL

## 2020-07-31 DIAGNOSIS — M67.873 OTHER SPECIFIED DISORDERS OF TENDON, RIGHT ANKLE AND FOOT: Primary | ICD-10-CM

## 2020-07-31 PROCEDURE — 97110 THERAPEUTIC EXERCISES: CPT | Performed by: PHYSICAL THERAPIST

## 2020-07-31 PROCEDURE — 97140 MANUAL THERAPY 1/> REGIONS: CPT | Performed by: PHYSICAL THERAPIST

## 2020-07-31 NOTE — PROGRESS NOTES
Daily Note     Today's date: 2020  Patient name: Edith Alvarado  : 1965  MRN: [id number]  Referring provider: Barbara Celestin DPM  Dx:   Encounter Diagnosis     ICD-10-CM    1  Other specified disorders of tendon, right ankle and foot M67 873             Subjective: Feet feeling a little yue    Objective: See treatment diary below  Progressed LE stretching and strengthening program         Assessment: Achilles pain decreasing  May have some plantar fascia tightness/irritation leading to recent AM symptoms  Plan: Continue per plan of care  Precautions: HTN, history of PE        Manuals 6/29/20 7/6 7/10 7/13 7/17 7/20 7/24 7/31     Transverse friction R achilles NV AFB RG RG CR  10 mins RG RG RG     Manual R calf stretch    RG CR  5 mins RG RG RG     Posterior talar glide (III)    3 min NV RG NP RG     STM R plantar fascia       5 min   RG RG     Neuro Re-Ed                                                                                                        Ther Ex             Standing gastrocnemius stretch NV 30"x3 30sec x 3 30sec x 3 30"x3 30sec x 3 30sec x 3 30sec x 3     Standing Soleus stretch NV attempted D/C          Leg Press  (sled 7) NV 55#x10  75#x10 75#  3 x 10 75#  3 x 10 75#  3x10 75#  3 x 12 75#  3 x 12 75#  3 x 12     Eccentric calf (R) strengthening NV x10 2 x 10 2 x 10 2x10 2 x 10 off of step 2 x 10 off of step 2 x 10 off of step     Multiphip flexion (do bilat) NV 25#  2x10 ea 25#  2 x 15 25#  2 x 15 25#  2x15 25#  2 x 15 25#  2 x 15 25#  2 x 15     Multiphip ABD (do bilat) NV 25#  2x10 ea 25#  2 x 10 25#  2 x 15 25#  2x15 25#  2 x 15 25#  2 x 15 25#  2 x 15     Achilles stretch off of step   NV 30sec x 3 30"x3 30sec x 3 30sec x 3 30sec x 3     Mini squats   NV 2 x 10 2x10 2 x 10 with ball squeeze 2 x 10 with ball squeeze 2 x10 with ball squeeze     Long sit HS/gastroc stretch        30sec x 3     Ther Activity                                       Gait Training Modalities             Iontophoresis with dexamethasone R achilles, wrapped + side medially 80 ma/min  RG 80 ma/min  AFB 80 ma min 80 ma min 80 ma  min 80 ma min 80 min min 80 ma min

## 2020-08-03 ENCOUNTER — OFFICE VISIT (OUTPATIENT)
Dept: PHYSICAL THERAPY | Facility: CLINIC | Age: 55
End: 2020-08-03
Payer: COMMERCIAL

## 2020-08-03 DIAGNOSIS — M67.873 OTHER SPECIFIED DISORDERS OF TENDON, RIGHT ANKLE AND FOOT: Primary | ICD-10-CM

## 2020-08-03 PROCEDURE — 97110 THERAPEUTIC EXERCISES: CPT

## 2020-08-03 PROCEDURE — 97140 MANUAL THERAPY 1/> REGIONS: CPT

## 2020-08-03 NOTE — PROGRESS NOTES
Daily Note     Today's date: 8/3/2020  Patient name: Adam Cassidy  : 1965  MRN: [id number]  Referring provider: Luzmaria Rivera DPM  Dx:   Encounter Diagnosis     ICD-10-CM    1  Other specified disorders of tendon, right ankle and foot  M67 873             Subjective: Patient reported R ankle improving overall  Does have some cramping over anterior foot  Has been using Voltaren on achilles on days when she does not come to treatment  Ionto patch lasts until the following morning  Objective: See treatment diary below  Assessment: Continued focus on increasing ankle ROM, gastroc and soleus flexibility  Manual addressed achilles restrictions along with presence of PF tightness  Will continue to benefit from interventions below to promote pain free function  Plan: Continue per plan of care  Precautions: HTN, history of PE      Manuals 6/29/20 7/6 7/10 7/13 7/17 7/20 7/24 7/31 8/3    Transverse friction R achilles NV AFB RG RG CR  10 mins RG RG RG EH    Manual R calf stretch    RG CR  5 mins RG RG RG EH    Posterior talar glide (III)    3 min NV RG NP RG RG    STM R plantar fascia       5 min   RG RG EH    Neuro Re-Ed                                                                                                        Ther Ex             Standing gastrocnemius stretch NV 30"x3 30sec x 3 30sec x 3 30"x3 30sec x 3 30sec x 3 30sec x 3 30"x3    Standing Soleus stretch NV attempted D/C          Leg Press  (sled 7) NV 55#x10  75#x10 75#  3 x 10 75#  3 x 10 75#  3x10 75#  3 x 12 75#  3 x 12 75#  3 x 12 75#  3x12    Eccentric calf (R) strengthening NV x10 2 x 10 2 x 10 2x10 2 x 10 off of step 2 x 10 off of step 2 x 10 off of step Off of step  2x10    Multiphip flexion (do bilat) NV 25#  2x10 ea 25#  2 x 15 25#  2 x 15 25#  2x15 25#  2 x 15 25#  2 x 15 25#  2 x 15 25#  2x15    Multiphip ABD (do bilat) NV 25#  2x10 ea 25#  2 x 10 25#  2 x 15 25#  2x15 25#  2 x 15 25#  2 x 15 25#  2 x 15 25#  2x15    Achilles stretch off of step   NV 30sec x 3 30"x3 30sec x 3 30sec x 3 30sec x 3 30"x3    Mini squats   NV 2 x 10 2x10 2 x 10 with ball squeeze 2 x 10 with ball squeeze 2 x10 with ball squeeze With ball squeeze  2x10    Long sit HS/gastroc stretch        30sec x 3  30"x3    Ther Activity                                       Gait Training                                       Modalities             Iontophoresis with dexamethasone R achilles, wrapped + side medially 80 ma/min  RG 80 ma/min  AFB 80 ma min 80 ma min 80 ma  min 80 ma min 80 min min 80 ma min 80 ma  min with coban

## 2020-08-06 ENCOUNTER — OFFICE VISIT (OUTPATIENT)
Dept: PHYSICAL THERAPY | Facility: CLINIC | Age: 55
End: 2020-08-06
Payer: COMMERCIAL

## 2020-08-06 DIAGNOSIS — M67.873 OTHER SPECIFIED DISORDERS OF TENDON, RIGHT ANKLE AND FOOT: Primary | ICD-10-CM

## 2020-08-06 PROCEDURE — 97140 MANUAL THERAPY 1/> REGIONS: CPT

## 2020-08-06 PROCEDURE — 97110 THERAPEUTIC EXERCISES: CPT

## 2020-08-06 NOTE — PROGRESS NOTES
Daily Note     Today's date: 2020  Patient name: Ning Shannon  : 1965  MRN: [id number]  Referring provider: Alexa Stephens DPM  Dx:   Encounter Diagnosis     ICD-10-CM    1  Other specified disorders of tendon, right ankle and foot  M67 873        Start Time: 56  Stop Time: 175  Total time in clinic (min): 50 minutes    Subjective: Pt reports R ankle/foot has been doing better  Notes she has been having b/l knee pain (R>L), described as "pinching," mostly when ascend/descend steps or after sitting for extended periods of time  States when this happens she notices herself compensating to avoid knee pain, which then aggravates her ankle  Objective: See treatment diary below  Provided pt with information on [place]/DF sock, to possible aide in increasing R ankle ROM and       Assessment: Tolerated treatment well  Pt is challenged with current program   Most challenged with standing eccentric calf raise, demonstrating signs of fatigue  Continues to respond well to manual techniques  Moderate soft tissue restriction along R achilles tendon that began to resolve with transverse friction  Patient would benefit from continued PT in effort to further improve ROM, increase flexibility of R achilles/gastroc, and maximize function with reduced pain/frequency of symptoms  Plan: Continue per plan of care  Precautions: HTN, history of PE      Manuals 6/29/20 7/6 7/10 7/13 7/17 7/20 7/24 7/31 8/3 8/6   Transverse friction R achilles NV AFB RG RG CR  10 mins RG RG RG EH AFB   Manual R calf stretch    RG CR  5 mins RG RG RG EH AFB   Posterior talar glide (III)    3 min NV RG NP RG RG RG   STM R plantar fascia       5 min   RG RG EH AFB   Neuro Re-Ed                                                                                                        Ther Ex             Standing gastrocnemius stretch NV 30"x3 30sec x 3 30sec x 3 30"x3 30sec x 3 30sec x 3 30sec x 3 30"x3 nv   Standing Soleus stretch NV attempted D/C          Leg Press  (sled 7) NV 55#x10  75#x10 75#  3 x 10 75#  3 x 10 75#  3x10 75#  3 x 12 75#  3 x 12 75#  3 x 12 75#  3x12 75#  3x12   Eccentric calf (R) strengthening NV x10 2 x 10 2 x 10 2x10 2 x 10 off of step 2 x 10 off of step 2 x 10 off of step Off of step  2x10 2 x 10 off of step   Multiphip flexion (do bilat) NV 25#  2x10 ea 25#  2 x 15 25#  2 x 15 25#  2x15 25#  2 x 15 25#  2 x 15 25#  2 x 15 25#  2x15 25#  2x15   Multiphip ABD (do bilat) NV 25#  2x10 ea 25#  2 x 10 25#  2 x 15 25#  2x15 25#  2 x 15 25#  2 x 15 25#  2 x 15 25#  2x15 25#  2x15   Achilles stretch off of step   NV 30sec x 3 30"x3 30sec x 3 30sec x 3 30sec x 3 30"x3 30"x4   Mini squats   NV 2 x 10 2x10 2 x 10 with ball squeeze 2 x 10 with ball squeeze 2 x10 with ball squeeze With ball squeeze  2x10 With ball squeeze  2x10   Long sit HS/gastroc stretch        30sec x 3  30"x3 30"x3   Ther Activity                                       Gait Training                                       Modalities             Iontophoresis with dexamethasone R achilles, wrapped + side medially 80 ma/min  RG 80 ma/min  AFB 80 ma min 80 ma min 80 ma  min 80 ma min 80 min min 80 ma min 80 ma  min with coban 80 ma  min with coban

## 2020-08-10 ENCOUNTER — OFFICE VISIT (OUTPATIENT)
Dept: PHYSICAL THERAPY | Facility: CLINIC | Age: 55
End: 2020-08-10
Payer: COMMERCIAL

## 2020-08-10 DIAGNOSIS — M67.873 OTHER SPECIFIED DISORDERS OF TENDON, RIGHT ANKLE AND FOOT: Primary | ICD-10-CM

## 2020-08-10 PROCEDURE — 97140 MANUAL THERAPY 1/> REGIONS: CPT

## 2020-08-10 PROCEDURE — 97110 THERAPEUTIC EXERCISES: CPT

## 2020-08-10 NOTE — PROGRESS NOTES
Daily Note     Today's date: 8/10/2020  Patient name: Hellen Rodriguez  : 1965  MRN: [id number]  Referring provider: Omar Steel DPM  Dx:   Encounter Diagnosis     ICD-10-CM    1  Other specified disorders of tendon, right ankle and foot  M67 873                   Subjective: Pt reports her R ankle/foot continues to improve  She has been working on stretching off her step at home, which has improved sx's  Aggriavtion does occur in foot when knee pain is present due to compensating when walking  She has appointment with MD tomorrow for b/l knee pain and possible Cortisone shots  Objective: See treatment diary below  Assessment: [place] tolerated treatment well  Good tolerance to all manual techniques, point tenderness noted along R medial aspect of achilles tendon with TFM  Pt continues to be challenged with current POC  Fatigue and weakness evident with standing eccentric calf raise  Pt noted strong pull/stretch with achilles stretch off step  No increase in foot/ankle discomfort throughout or post session  Patient would benefit from continued PT to further address impairments and maximize functional level  Plan: Continue per plan of care  Pt 1:1 with PTA JW 10:55-11:35     Precautions: HTN, history of PE      Manuals 20 7/6 7/10 7/13 7/17 7/20 7/24 7/31 8/3 8/6 8/10   Transverse friction R achilles NV AFB RG RG CR  10 mins RG RG RG EH AFB JW   Manual R calf stretch    RG CR  5 mins RG RG RG EH AFB JW   Posterior talar glide (III)    3 min NV RG NP RG RG RG RG   STM R plantar fascia       5 min   RG RG EH AFB JW   Neuro Re-Ed                                                                                                                Ther Ex              Standing gastrocnemius stretch NV 30"x3 30sec x 3 30sec x 3 30"x3 30sec x 3 30sec x 3 30sec x 3 30"x3 nv 30"x3   Standing Soleus stretch NV attempted D/C           Leg Press  (sled 7) NV 55#x10  75#x10 75#  3 x 10 75#  3 x 10 75#  3x10 75#  3 x 12 75#  3 x 12 75#  3 x 12 75#  3x12 75#  3x12 75#  3x12   Eccentric calf (R) strengthening NV x10 2 x 10 2 x 10 2x10 2 x 10 off of step 2 x 10 off of step 2 x 10 off of step Off of step  2x10 2 x 10 off of step 2 x 10 off of step   Multiphip flexion (do bilat) NV 25#  2x10 ea 25#  2 x 15 25#  2 x 15 25#  2x15 25#  2 x 15 25#  2 x 15 25#  2 x 15 25#  2x15 25#  2x15 25#  2x15   Multiphip ABD (do bilat) NV 25#  2x10 ea 25#  2 x 10 25#  2 x 15 25#  2x15 25#  2 x 15 25#  2 x 15 25#  2 x 15 25#  2x15 25#  2x15 25#  2x15   Achilles stretch off of step   NV 30sec x 3 30"x3 30sec x 3 30sec x 3 30sec x 3 30"x3 30"x4 30"x4   Mini squats   NV 2 x 10 2x10 2 x 10 with ball squeeze 2 x 10 with ball squeeze 2 x10 with ball squeeze With ball squeeze  2x10 With ball squeeze  2x10 With ball squeeze  2x10   Long sit HS/gastroc stretch        30sec x 3  30"x3 30"x3 30"x3   Ther Activity                                          Gait Training                                          Modalities              Iontophoresis with dexamethasone R achilles, wrapped + side medially 80 ma/min  RG 80 ma/min  AFB 80 ma min 80 ma min 80 ma  min 80 ma min 80 min min 80 ma min 80 ma  min with coban 80 ma  min with coban 80 ma  min with coban

## 2020-08-11 ENCOUNTER — APPOINTMENT (OUTPATIENT)
Dept: RADIOLOGY | Facility: MEDICAL CENTER | Age: 55
End: 2020-08-11
Payer: COMMERCIAL

## 2020-08-11 ENCOUNTER — OFFICE VISIT (OUTPATIENT)
Dept: OBGYN CLINIC | Facility: MEDICAL CENTER | Age: 55
End: 2020-08-11
Payer: COMMERCIAL

## 2020-08-11 VITALS
DIASTOLIC BLOOD PRESSURE: 83 MMHG | TEMPERATURE: 96.6 F | HEART RATE: 68 BPM | BODY MASS INDEX: 48.82 KG/M2 | SYSTOLIC BLOOD PRESSURE: 138 MMHG | WEIGHT: 293 LBS | HEIGHT: 65 IN

## 2020-08-11 DIAGNOSIS — M17.0 PRIMARY OSTEOARTHRITIS OF BOTH KNEES: Primary | ICD-10-CM

## 2020-08-11 DIAGNOSIS — M17.0 PRIMARY OSTEOARTHRITIS OF BOTH KNEES: ICD-10-CM

## 2020-08-11 PROCEDURE — 72100 X-RAY EXAM L-S SPINE 2/3 VWS: CPT

## 2020-08-11 PROCEDURE — 1036F TOBACCO NON-USER: CPT | Performed by: ORTHOPAEDIC SURGERY

## 2020-08-11 PROCEDURE — 72170 X-RAY EXAM OF PELVIS: CPT

## 2020-08-11 PROCEDURE — 99213 OFFICE O/P EST LOW 20 MIN: CPT | Performed by: ORTHOPAEDIC SURGERY

## 2020-08-11 PROCEDURE — 3008F BODY MASS INDEX DOCD: CPT | Performed by: ORTHOPAEDIC SURGERY

## 2020-08-11 PROCEDURE — 3075F SYST BP GE 130 - 139MM HG: CPT | Performed by: ORTHOPAEDIC SURGERY

## 2020-08-11 PROCEDURE — 3079F DIAST BP 80-89 MM HG: CPT | Performed by: ORTHOPAEDIC SURGERY

## 2020-08-11 NOTE — PROGRESS NOTES
Orthopaedic Surgery - Office Note  Marii Hameed (76 y o  female)   : 1965   MRN: [id number]  Encounter Date: 2020    Chief Complaint   Patient presents with    Left Knee - Follow-up    Right Knee - Follow-up       Assessment / Plan  Bilateral knee osteoarthritis     · Continue Voltaren gel, ice and heat for pain relief  · Activity as tolerated  · Home exercise program reviewed  · Continue outpatient PT  · At her next appointment, when she receives her Synvisc injection we will discuss her knee, hip and back pain that she has experienced since this appointment, and discuss further treatment options for her hips and possible injections for her spine pain  Return for Recheck when we recieve synvisc  History of Present Illness  Marii Hameed is a 48 y o  female who presents for follow-up evaluation of bilateral knee pain  She indicates that her right knee is usually more painful than her left knee  She states she does not experience pain during the day and a daily basis however by the end of the day she is in severe pain  She indicates stairs a painful for her and she either needs to use a cane to sister up and down stairs or she takes stairs 1 x 1  She states she is able he to get pain relief with the use of Voltaren gel  She has been compliant with formal physical therapy 2 times a week  Since her last appointment she has lost 18 lbs  She also indicates she has tried bracing however she finds it difficult to find a brace that fits her appropriately  At her last appointment she was referred to Rheumatology who she did see however her blood work was negative  She is also unable to recall if she was provided with any pain relief from the cortisone injection performed at her last appointment  At today's appointment she would like to discuss moving forward with Euflexxa injections  She denies any further injury or trauma to her bilateral knees  She denies any distal paresthesias      Review of Systems  Pertinent items are noted in HPI  All other systems were reviewed and are negative  Physical Exam  /83   Pulse 68   Temp (!) 96 6 °F (35 9 °C)   Ht 5' 5" (1 651 m)   Wt (!) 140 kg (308 lb)   BMI 51 25 kg/m²   Cons: Appears well  No apparent distress  Psych: Alert  Oriented x3  Mood and affect normal   Eyes: PERRLA, EOMI  Resp: Normal effort  No audible wheezing or stridor  CV: Palpable pulse  No discernable arrhythmia  Trace LE edema  Lymph:  No palpable cervical, axillary, or inguinal lymphadenopathy  Skin: Warm  No palpable masses  No visible lesions  Neuro: Normal muscle tone  Normal and symmetric DTR's  Bilateral Knee Exam  Alignment:  Normal knee alignment  Inspection:  mild swelling  Palpation:  Medial joint line tenderness  ROM:  Knee Extension 0  Knee Flexion 115  Strength:  5/5 quadriceps and hamstrings  Able to actively extend knee against gravity  Stability:  No objective knee instability  Stable Varus / Valgus stress, Lachman, and Posterior drawer  Tests:  (-) Deigo  Patella:  Normal patellar mobility  Neurovascular:  Sensation intact in DP/SP/Duval/Sa/T nerve distributions  2+ DP & PT pulses  Gait:  Normal     Studies Reviewed  I personally reviewed the xray of lumbar spine obtained in the office today which demonstrated thoracolumbar bridging osteophytes, moderate facet osteoarthritis and L4 on L5 grade 1 spondylolisthesis    I personally reviewed x-rays of bilateral knee obtained on February 12, 2019 which demonstrated mild bilateral knee osteoarthritis    Procedures  No procedures today  Medical, Surgical, Family, and Social History  The patient's medical history, family history, and social history, were reviewed and updated as appropriate      Past Medical History:   Diagnosis Date    Constipation     Hay fever     Pulmonary embolism ([address] )        Past Surgical History:   Procedure Laterality Date    HYSTERECTOMY  2013   [address] [facility], [facility] REDUCTION MAMMAPLASTY  2011       Family History   Problem Relation Age of Onset    Hypertension Mother    [place] Al[place] Rheum arthritis Mother     Prostate cancer Father     Hypertension Father     Heart attack Father     Glaucoma Father     Hypertension Maternal Grandmother     Stroke Maternal Grandmother     Heart attack Paternal Grandmother        Social History     Occupational History    Not on file   Tobacco Use    Smoking status: Never Smoker    Smokeless tobacco: Never Used   Substance and Sexual Activity    Alcohol use: Yes     Comment: socially    Drug use: No    Sexual activity: Not on file       Allergies   Allergen Reactions    Seasonal Ic  [Cholestatin]          Current Outpatient Medications:     ALPRAZolam (XANAX) 0 5 mg tablet, Take 1 tablet (0 5 mg total) by mouth daily at bedtime as needed for anxiety, Disp: 20 tablet, Rfl: 1    ergocalciferol (VITAMIN D2) 50,000 units, TAKE ONE CAPSULE BY MOUTH ONE TIME PER WEEK, Disp: 12 capsule, Rfl: 0    hydrochlorothiazide (HYDRODIURIL) 25 mg tablet, Take 25 mg by mouth, Disp: , Rfl:     montelukast (SINGULAIR) 10 mg tablet, TAKE 1 TABLET BY MOUTH EVERY DAY, Disp: 30 tablet, Rfl: 0    SUMAtriptan (IMITREX) 100 mg tablet, Take 1 tablet (100 mg total) by mouth once as needed for migraine for up to 1 dose Pt requests BRAND NECCESSARY, Disp: 10 tablet, Rfl: 1      Amanda Paldavido    Scribe Attestation    I,:   Tu Ceballos am acting as a scribe while in the presence of the attending physician :        I,:   Lenora Blake MD personally performed the services described in this documentation    as scribed in my presence :

## 2020-08-14 ENCOUNTER — EVALUATION (OUTPATIENT)
Dept: PHYSICAL THERAPY | Facility: CLINIC | Age: 55
End: 2020-08-14
Payer: COMMERCIAL

## 2020-08-14 ENCOUNTER — APPOINTMENT (OUTPATIENT)
Dept: PHYSICAL THERAPY | Facility: CLINIC | Age: 55
End: 2020-08-14
Payer: COMMERCIAL

## 2020-08-14 DIAGNOSIS — M67.873 OTHER SPECIFIED DISORDERS OF TENDON, RIGHT ANKLE AND FOOT: Primary | ICD-10-CM

## 2020-08-14 DIAGNOSIS — M25.561 BILATERAL CHRONIC KNEE PAIN: ICD-10-CM

## 2020-08-14 DIAGNOSIS — G89.29 BILATERAL CHRONIC KNEE PAIN: ICD-10-CM

## 2020-08-14 DIAGNOSIS — M25.562 BILATERAL CHRONIC KNEE PAIN: ICD-10-CM

## 2020-08-14 PROCEDURE — 97140 MANUAL THERAPY 1/> REGIONS: CPT | Performed by: PHYSICAL THERAPIST

## 2020-08-14 PROCEDURE — 97164 PT RE-EVAL EST PLAN CARE: CPT | Performed by: PHYSICAL THERAPIST

## 2020-08-14 PROCEDURE — 97110 THERAPEUTIC EXERCISES: CPT | Performed by: PHYSICAL THERAPIST

## 2020-08-14 NOTE — LETTER
2020    Pancho Cohn DPM  [address] [employer]    Patient: Rere Fonseca   YOB: 1965   Date of Visit: 2020     Encounter Diagnosis     ICD-10-CM    1  Other specified disorders of tendon, right ankle and foot  M67 873    2  Bilateral chronic knee pain  M25 561     M25 562     G89 29        Dear Dr Aline Koroma:    Thank you for your recent referral of Rere Fonseca  Please review the attached evaluation summary from [place]'s recent visit  Please verify that you agree with the plan of care by signing the attached order  If you have any questions or concerns, please do not hesitate to call  I sincerely appreciate the opportunity to share in the care of one of your patients and hope to have another opportunity to work with you in the near future  Sincerely,    Isaiah Conde, PT      Referring Provider:      I certify that I have read the below Plan of Care and certify the need for these services furnished under this plan of treatment while under my care  [facility]          Reevaluation    Today's date: 8/15/2020  Patient name: Rere Fonseca  : 1965  MRN: [id number]  Referring provider: Kayla Colunga DPM  Dx:   Encounter Diagnosis     ICD-10-CM    1  Other specified disorders of tendon, right ankle and foot  M67 873    2  Bilateral chronic knee pain  M25 561     M25 562     G89 29                  Assessment  Assessment details: Patient is a 48 year  female who  presents with with a diagnosis of R achilles tendinitis  Since starting therapy pt has made the following progress towards goals:  1) Decreased pain  2) Improved range of motion  3) Improved strength  4) Improved self rated function  Making good progress, still with functional limitations as a result of pain, weakness and AROM loss    Recommend continued short course of therapy, see updated goals below  Also recently consulted with Dr Joaquín Arciniega regarding chronic bilateral knee pain  Synvisc ordered and was advised to continue therapy adding in more intervention for her knees  Will formally add knee pain diagnosis to plan of care  Understanding of Dx/Px/POC: excellent  Goals  Short Term Goals:  1) Pain : Decrease R achilles to  pain to 2/10 at worst x 1 continuous week within 2-3 weeks  -met  2) AROM: Improve R AROM by at least 6 degrees for DF within 2-3 weeks  -met  3) Strength: Improved R LE strength by at least 1/2 grade for all noted as weak within 2-3 weeks  -met  4) Function: Improved FOTO score from IE within 2-3 weeks (61 at time of IE), patient to note greater ease of ambulation  within 2-3 weeks-partially met    LongTerm Goals:  1) Pain : Eliminate R achilles  pain  x 1 continuous week within 4-6 weeks  -not met  2) AROM: Improve ankle AROM to full  within 4-6 weeks  -not met  3) Strength: Improved R LE strength to 5/5 within 6 weeks  -not met  4) Function: Improved FOTO score to at least 67 within 6 weeks ; no reported difficulty with ADLs as they pertain to ankle within 6 weeks  -not met  5) Independent  with HEP within 6 weeks  -not met    Short Term Goals:  1) Pain : Decrease knee  pain to 2/10 at worst x 1 continuous week within 2-3 weeks  2) Strength: Improved LE strength by at least 1/2 grade for all noted as weak within 2-3 weeks  3) Function: Improved FOTO score from IE within 2-3 weeks, patient to note greater ease of ambulation subjectively within 2-3 weeks  LongTerm Goals:  1) Pain : Eliminate knee pain  x 1 continuous week within 4-6 weeks  2) Strength: Improved LE strength to 5/5 within 6 weeks  3) Function: Improved FOTO score to at least 75 ; no reported difficulty with ADLs as they pertain to kneeswithin 6 weeks  4) (I) with HEP within 6 weeks             Plan  Patient would benefit from: skilled occupational therapy  Planned modality interventions: TENS, thermotherapy: hydrocollator packs, cryotherapy and ultrasound  Planned therapy interventions: home exercise program, stretching, strengthening, functional ROM exercises, balance/weight bearing training, manual therapy and therapeutic exercise  Frequency: 2x week  Duration in weeks: 4-  6  Treatment plan discussed with: patient         Subjective Evaluation     History of Present Illness  Mechanism of injury: Patient is a 48 y o  old female who presents for an initial outpatient physical therapy consultation regarding their R achilles pain  Reports insidious onset of achilles pain about 6 months ago  Saw podiatry at that time and was given a script to start therapy including iontophoresis with dexamath   also with bilateral knee pain associated with OA  UPDATE 20:   Arely Boo reports improving R achilles pain with walking/ADLs  No more than 2/10 achilles pain over the past week  Feels stretching and use of iontophoresis with dexamethasone is helping  Bilateral knee pain has been worsening  Recently consulted with ortho regarding viscosupplementation  Xrays to bilateral knees + for mild OA                  Pain(achilles)  Current pain ratin  At best pain ratin  At worst pain ratin  Quality: sharp  Aggravating factors: standing and walking          Pain(knees)  Current pain rating: 3  At best pain ratin  At worst pain ratin  Quality: sharp  Aggravating factors: standing and walking, stairs        Objective      Tenderness      Right Ankle/Foot   Tenderness in the mid substance of R Achilles insertion       Active Range of Motion   Left Knee   Flexion: 125 degrees   Extension: 0 degrees      Right Knee   Flexion: 125 degrees, pain  Extension: 0 degrees     Left Ankle/Foot   Dorsiflexion (ke): 5 degrees   Plantar flexion: 45 degrees   Inversion: 30 degrees   Eversion: 15 degrees      Right Ankle/Foot   Dorsiflexion (ke): 6 degrees with pain  Plantar flexion: 48 degrees   Inversion: 30 degrees   Eversion: 15 degrees with pain     Passive Range of Motion      Right Ankle/Foot    Dorsiflexion (ke): 10 degrees      Strength/Myotome Testing      Left Knee   Flexion: 5  Extension: 5     Right Knee   Flexion: 5  Extension: 5     Left Ankle/Foot   Dorsiflexion: 5  Plantar flexion: 5  Inversion: 5  Eversion: 5     Right Ankle/Foot   Dorsiflexion: 5  Plantar flexion: 4+ (pain)  Inversion: 5  Eversion: 4+ (pain)     Tests      Right Ankle/Foot   Negative for Con Dinning       Ambulation   Weight-Bearing Status   Weight-Bearing Status (Left): full weight bearing   Weight-Bearing Status (Right): full weight-bearing         Observational Gait   Normal on level ground  Reciprocal on stairs, bilateral knee pain with descending stairs during eccentric load  Functional testing:  Bilateral knee pain with squat            Precautions: HTN, history of PE      Manuals 6/29/20 7/6 7/10 7/13 7/17 7/20 7/24 7/31 8/3 8/6 8/10   Transverse friction R achilles NV AFB RG RG CR  10 mins RG RG RG EH AFB JW   Manual R calf stretch    RG CR  5 mins RG RG RG EH AFB JW   Posterior talar glide (III)    3 min NV RG NP RG RG RG RG   STM R plantar fascia       5 min   RG RG EH AFB JW   Neuro Re-Ed                                                                                                                Ther Ex              Standing gastrocnemius stretch NV 30"x3 30sec x 3 30sec x 3 30"x3 30sec x 3 30sec x 3 30sec x 3 30"x3 nv 30"x3   Standing Soleus stretch NV attempted D/C           Leg Press  (sled 7) NV 55#x10  75#x10 75#  3 x 10 75#  3 x 10 75#  3x10 75#  3 x 12 75#  3 x 12 75#  3 x 12 75#  3x12 75#  3x12 75#  3x12   Eccentric calf (R) strengthening NV x10 2 x 10 2 x 10 2x10 2 x 10 off of step 2 x 10 off of step 2 x 10 off of step Off of step  2x10 2 x 10 off of step 2 x 10 off of step   Multiphip flexion (do bilat) NV 25#  2x10 ea 25#  2 x 15 25#  2 x 15 25#  2x15 25#  2 x 15 25#  2 x 15 25#  2 x 15 25#  2x15 25#  2x15 25#  2x15   Multiphip ABD (do bilat) NV 25#  2x10 ea 25#  2 x 10 25#  2 x 15 25#  2x15 25#  2 x 15 25#  2 x 15 25#  2 x 15 25#  2x15 25#  2x15 25#  2x15   Achilles stretch off of step   NV 30sec x 3 30"x3 30sec x 3 30sec x 3 30sec x 3 30"x3 30"x4 30"x4   Mini squats   NV 2 x 10 2x10 2 x 10 with ball squeeze 2 x 10 with ball squeeze 2 x10 with ball squeeze With ball squeeze  2x10 With ball squeeze  2x10 With ball squeeze  2x10   Long sit HS/gastroc stretch        30sec x 3  30"x3 30"x3 30"x3   Ther Activity                                          Gait Training                                          Modalities              Iontophoresis with dexamethasone R achilles, wrapped + side medially 80 ma/min  RG 80 ma/min  AFB 80 ma min 80 ma min 80 ma  min 80 ma min 80 min min 80 ma min 80 ma  min with coban 80 ma  min with coban 80 ma  min with coban

## 2020-08-15 NOTE — PROGRESS NOTES
Reevaluation    Today's date: 8/15/2020  Patient name: Mary Kate Ro  : 1965  MRN: [id number]  Referring provider: Carolyn Decker DPM  Dx:   Encounter Diagnosis     ICD-10-CM    1  Other specified disorders of tendon, right ankle and foot  M67 873    2  Bilateral chronic knee pain  M25 561     M25 562     G89 29                  Assessment  Assessment details: Patient is a 48 year  female who  presents with with a diagnosis of R achilles tendinitis  Since starting therapy pt has made the following progress towards goals:  1) Decreased pain  2) Improved range of motion  3) Improved strength  4) Improved self rated function  Making good progress, still with functional limitations as a result of pain, weakness and AROM loss  Recommend continued short course of therapy, see updated goals below  Also recently consulted with Dr Osmar Mckenzie regarding chronic bilateral knee pain  Synvisc ordered and was advised to continue therapy adding in more intervention for her knees  Will formally add knee pain diagnosis to plan of care  Understanding of Dx/Px/POC: excellent  Goals  Short Term Goals:  1) Pain : Decrease R achilles to  pain to 2/10 at worst x 1 continuous week within 2-3 weeks  -met  2) AROM: Improve R AROM by at least 6 degrees for DF within 2-3 weeks  -met  3) Strength: Improved R LE strength by at least 1/2 grade for all noted as weak within 2-3 weeks  -met  4) Function: Improved FOTO score from IE within 2-3 weeks (61 at time of IE), patient to note greater ease of ambulation  within 2-3 weeks-partially met    LongTerm Goals:  1) Pain : Eliminate R achilles  pain  x 1 continuous week within 4-6 weeks  -not met  2) AROM: Improve ankle AROM to full  within 4-6 weeks  -not met  3) Strength: Improved R LE strength to 5/5 within 6 weeks  -not met  4) Function: Improved FOTO score to at least 67 within 6 weeks ; no reported difficulty with ADLs as they pertain to ankle within 6 weeks  -not met  5) Independent  with HEP within 6 weeks  -not met    Short Term Goals:  1) Pain : Decrease knee  pain to 2/10 at worst x 1 continuous week within 2-3 weeks  2) Strength: Improved LE strength by at least 1/2 grade for all noted as weak within 2-3 weeks  3) Function: Improved FOTO score from IE within 2-3 weeks, patient to note greater ease of ambulation subjectively within 2-3 weeks  LongTerm Goals:  1) Pain : Eliminate knee pain  x 1 continuous week within 4-6 weeks  2) Strength: Improved LE strength to 5/5 within 6 weeks  3) Function: Improved FOTO score to at least 75 ; no reported difficulty with ADLs as they pertain to kneeswithin 6 weeks  4) (I) with HEP within 6 weeks  Plan  Patient would benefit from: skilled occupational therapy  Planned modality interventions: TENS, thermotherapy: hydrocollator packs, cryotherapy and ultrasound  Planned therapy interventions: home exercise program, stretching, strengthening, functional ROM exercises, balance/weight bearing training, manual therapy and therapeutic exercise  Frequency: 2x week  Duration in weeks: 4-  6  Treatment plan discussed with: patient         Subjective Evaluation     History of Present Illness  Mechanism of injury: Patient is a 48 y o  old female who presents for an initial outpatient physical therapy consultation regarding their R achilles pain  Reports insidious onset of achilles pain about 6 months ago  Saw podiatry at that time and was given a script to start therapy including iontophoresis with dexamath   also with bilateral knee pain associated with OA  UPDATE 8/14/20:   Davian Hull reports improving R achilles pain with walking/ADLs  No more than 2/10 achilles pain over the past week  Feels stretching and use of iontophoresis with dexamethasone is helping  Bilateral knee pain has been worsening  Recently consulted with ortho regarding viscosupplementation  Xrays to bilateral knees + for mild OA  Jarrod Side Pain(achilles)  Current pain ratin  At best pain ratin  At worst pain ratin  Quality: sharp  Aggravating factors: standing and walking          Pain(knees)  Current pain rating: 3  At best pain ratin  At worst pain ratin  Quality: sharp  Aggravating factors: standing and walking, stairs        Objective      Tenderness      Right Ankle/Foot   Tenderness in the mid substance of R Achilles insertion       Active Range of Motion   Left Knee   Flexion: 125 degrees   Extension: 0 degrees      Right Knee   Flexion: 125 degrees, pain  Extension: 0 degrees     Left Ankle/Foot   Dorsiflexion (ke): 5 degrees   Plantar flexion: 45 degrees   Inversion: 30 degrees   Eversion: 15 degrees      Right Ankle/Foot   Dorsiflexion (ke): 6 degrees with pain  Plantar flexion: 48 degrees   Inversion: 30 degrees   Eversion: 15 degrees with pain     Passive Range of Motion      Right Ankle/Foot    Dorsiflexion (ke): 10 degrees      Strength/Myotome Testing      Left Knee   Flexion: 5  Extension: 5     Right Knee   Flexion: 5  Extension: 5     Left Ankle/Foot   Dorsiflexion: 5  Plantar flexion: 5  Inversion: 5  Eversion: 5     Right Ankle/Foot   Dorsiflexion: 5  Plantar flexion: 4+ (pain)  Inversion: 5  Eversion: 4+ (pain)     Tests      Right Ankle/Foot   Negative for Justice       Ambulation   Weight-Bearing Status   Weight-Bearing Status (Left): full weight bearing   Weight-Bearing Status (Right): full weight-bearing         Observational Gait   Normal on level ground  Reciprocal on stairs, bilateral knee pain with descending stairs during eccentric load  Functional testing:  Bilateral knee pain with squat            Precautions: HTN, history of PE      Manuals 6/29/20 7/6 7/10 7/13 7/17 7/20 7/24 7/31 8/3 8/6 8/10   Transverse friction R achilles NV AFB RG RG CR  10 mins RG RG RG EH AFB JW   Manual R calf stretch    RG CR  5 mins RG RG RG EH AFB JW   Posterior talar glide (III)    3 min NV RG NP RG RG RG RG   STM R plantar fascia       5 min   RG RG EH AFB JW   Neuro Re-Ed                                                                                                                Ther Ex              Standing gastrocnemius stretch NV 30"x3 30sec x 3 30sec x 3 30"x3 30sec x 3 30sec x 3 30sec x 3 30"x3 nv 30"x3   Standing Soleus stretch NV attempted D/C           Leg Press  (sled 7) NV 55#x10  75#x10 75#  3 x 10 75#  3 x 10 75#  3x10 75#  3 x 12 75#  3 x 12 75#  3 x 12 75#  3x12 75#  3x12 75#  3x12   Eccentric calf (R) strengthening NV x10 2 x 10 2 x 10 2x10 2 x 10 off of step 2 x 10 off of step 2 x 10 off of step Off of step  2x10 2 x 10 off of step 2 x 10 off of step   Multiphip flexion (do bilat) NV 25#  2x10 ea 25#  2 x 15 25#  2 x 15 25#  2x15 25#  2 x 15 25#  2 x 15 25#  2 x 15 25#  2x15 25#  2x15 25#  2x15   Multiphip ABD (do bilat) NV 25#  2x10 ea 25#  2 x 10 25#  2 x 15 25#  2x15 25#  2 x 15 25#  2 x 15 25#  2 x 15 25#  2x15 25#  2x15 25#  2x15   Achilles stretch off of step   NV 30sec x 3 30"x3 30sec x 3 30sec x 3 30sec x 3 30"x3 30"x4 30"x4   Mini squats   NV 2 x 10 2x10 2 x 10 with ball squeeze 2 x 10 with ball squeeze 2 x10 with ball squeeze With ball squeeze  2x10 With ball squeeze  2x10 With ball squeeze  2x10   Long sit HS/gastroc stretch        30sec x 3  30"x3 30"x3 30"x3   Ther Activity                                          Gait Training                                          Modalities              Iontophoresis with dexamethasone R achilles, wrapped + side medially 80 ma/min  RG 80 ma/min  AFB 80 ma min 80 ma min 80 ma  min 80 ma min 80 min min 80 ma min 80 ma  min with coban 80 ma  min with coban 80 ma  min with coban

## 2020-08-17 ENCOUNTER — TRANSCRIBE ORDERS (OUTPATIENT)
Dept: PHYSICAL THERAPY | Facility: CLINIC | Age: 55
End: 2020-08-17

## 2020-08-17 ENCOUNTER — OFFICE VISIT (OUTPATIENT)
Dept: PHYSICAL THERAPY | Facility: CLINIC | Age: 55
End: 2020-08-17
Payer: COMMERCIAL

## 2020-08-17 DIAGNOSIS — G89.29 BILATERAL CHRONIC KNEE PAIN: ICD-10-CM

## 2020-08-17 DIAGNOSIS — M25.561 BILATERAL CHRONIC KNEE PAIN: ICD-10-CM

## 2020-08-17 DIAGNOSIS — M25.562 BILATERAL CHRONIC KNEE PAIN: ICD-10-CM

## 2020-08-17 DIAGNOSIS — M67.873 OTHER SPECIFIED DISORDERS OF TENDON, RIGHT ANKLE AND FOOT: Primary | ICD-10-CM

## 2020-08-17 PROCEDURE — 97140 MANUAL THERAPY 1/> REGIONS: CPT | Performed by: PHYSICAL THERAPIST

## 2020-08-17 PROCEDURE — 97110 THERAPEUTIC EXERCISES: CPT | Performed by: PHYSICAL THERAPIST

## 2020-08-17 NOTE — PROGRESS NOTES
Daily Note     Today's date: 2020  Patient name: Mary Kate Ro  : 1965  MRN: [id number]  Referring provider: Carolyn Decker DPM  Dx:   Encounter Diagnosis     ICD-10-CM    1  Other specified disorders of tendon, right ankle and foot  M67 873    2  Bilateral chronic knee pain  M25 561     M25 562     G89 29                   Subjective: Noticing better flexibility in R ankle and achilles while walking down stairs  No new c/o regarding knee pain  Objective: See treatment diary below  Assessment: Tolerated treatment well  Patient demonstrated fatigue post treatment, exhibited good technique with therapeutic exercises and would benefit from continued PT      Plan: Continue per plan of care  Precautions: HTN, history of PE      Manuals 20             Transverse friction R achilles RF             Manual R calf stretch RG             Posterior talar glide (III) RG             STM R plantar fascia RG             Neuro Re-Ed                                                                                                                Ther Ex              Step gastroc stretch 30sec x 3                           Leg Press  (sled 7) 85#  3 x 12             Eccentric calf (R) strengthening 2 x 10             Multiphip flexion (do bilat) 25#  2 x 15             Multiphip ABD (do bilat) 25#  2 x 15                           Mini squats with ball squeeze 3 x 10             Long sit HS/gastroc stretch 30sec x 3             Ther Activity                                          Gait Training                                          Modalities              Iontophoresis with dexamethasone R achilles, wrapped + side medially 80 ma min

## 2020-08-21 ENCOUNTER — OFFICE VISIT (OUTPATIENT)
Dept: PHYSICAL THERAPY | Facility: CLINIC | Age: 55
End: 2020-08-21
Payer: COMMERCIAL

## 2020-08-21 DIAGNOSIS — M67.873 OTHER SPECIFIED DISORDERS OF TENDON, RIGHT ANKLE AND FOOT: ICD-10-CM

## 2020-08-21 DIAGNOSIS — G89.29 BILATERAL CHRONIC KNEE PAIN: Primary | ICD-10-CM

## 2020-08-21 DIAGNOSIS — M25.562 BILATERAL CHRONIC KNEE PAIN: Primary | ICD-10-CM

## 2020-08-21 DIAGNOSIS — M25.561 BILATERAL CHRONIC KNEE PAIN: Primary | ICD-10-CM

## 2020-08-21 NOTE — PROGRESS NOTES
Pt forgot about appointment today  [address] called to try to RS her  I did not have time in my schedule this afternoon to give her a full visit  She stopped in with her dexamethasone and I applied iontophoresis patch to get her through the weekend  Continue formal therapy next week

## 2020-08-24 ENCOUNTER — OFFICE VISIT (OUTPATIENT)
Dept: PHYSICAL THERAPY | Facility: CLINIC | Age: 55
End: 2020-08-24
Payer: COMMERCIAL

## 2020-08-24 DIAGNOSIS — M67.873 OTHER SPECIFIED DISORDERS OF TENDON, RIGHT ANKLE AND FOOT: ICD-10-CM

## 2020-08-24 DIAGNOSIS — M25.562 BILATERAL CHRONIC KNEE PAIN: Primary | ICD-10-CM

## 2020-08-24 DIAGNOSIS — G89.29 BILATERAL CHRONIC KNEE PAIN: Primary | ICD-10-CM

## 2020-08-24 DIAGNOSIS — M25.561 BILATERAL CHRONIC KNEE PAIN: Primary | ICD-10-CM

## 2020-08-24 PROCEDURE — 97140 MANUAL THERAPY 1/> REGIONS: CPT | Performed by: PHYSICAL THERAPIST

## 2020-08-24 PROCEDURE — 97110 THERAPEUTIC EXERCISES: CPT | Performed by: PHYSICAL THERAPIST

## 2020-08-24 NOTE — PROGRESS NOTES
Daily Note     Today's date: 2020  Patient name: Florentin Rodriguez  : 1965  MRN: [id number]  Referring provider: Michi Nichols DPM  Dx:   Encounter Diagnosis     ICD-10-CM    1  Bilateral chronic knee pain  M25 561     M25 562     G89 29    2  Other specified disorders of tendon, right ankle and foot  M67 873                   Subjective: Patient reports being a little sore today but she believes it to be secondary to the increased walking while she was on vacation and walking in the sand  Objective: See treatment diary below      Assessment: Patient tolerated treatment well  She reports soreness throughout the session but able to complete all activities as noted below  She has good tolerance to manual therapy  She would benefit from continued physical therapy  Plan: Continue per plan of care  Precautions: HTN, history of PE      Manuals 20            Transverse friction R achilles RF SK            Manual R calf stretch RG SK            Posterior talar glide (III) RG SK            STM R plantar fascia RG SK            Neuro Re-Ed                                                                                                                Ther Ex              Step gastroc stretch 30sec x 3 30 sec x3                          Leg Press  (sled 7) 85#  3 x 12 85# 3x12            Eccentric calf (R) strengthening 2 x 10 2x10            Multiphip flexion (do bilat) 25#  2 x 15 25# 2x15            Multiphip ABD (do bilat) 25#  2 x 15 25# 2x15                          Mini squats with ball squeeze 3 x 10 3x10            Long sit HS/gastroc stretch 30sec x 3 30"x3            Ther Activity                                          Gait Training                                          Modalities              Iontophoresis with dexamethasone R achilles, wrapped + side medially 80 ma min 80 ma min

## 2020-08-28 ENCOUNTER — OFFICE VISIT (OUTPATIENT)
Dept: PHYSICAL THERAPY | Facility: CLINIC | Age: 55
End: 2020-08-28
Payer: COMMERCIAL

## 2020-08-28 DIAGNOSIS — G89.29 BILATERAL CHRONIC KNEE PAIN: Primary | ICD-10-CM

## 2020-08-28 DIAGNOSIS — M25.561 BILATERAL CHRONIC KNEE PAIN: Primary | ICD-10-CM

## 2020-08-28 DIAGNOSIS — M67.873 OTHER SPECIFIED DISORDERS OF TENDON, RIGHT ANKLE AND FOOT: ICD-10-CM

## 2020-08-28 DIAGNOSIS — M25.562 BILATERAL CHRONIC KNEE PAIN: Primary | ICD-10-CM

## 2020-08-28 PROCEDURE — 97110 THERAPEUTIC EXERCISES: CPT

## 2020-08-28 PROCEDURE — 97140 MANUAL THERAPY 1/> REGIONS: CPT

## 2020-08-28 NOTE — PROGRESS NOTES
Daily Note     Today's date: 2020  Patient name: Scarlett Cantrell  : 1965  MRN: [id number]  Referring provider: Wild Viveros DPM  Dx:   Encounter Diagnosis     ICD-10-CM    1  Bilateral chronic knee pain  M25 561     M25 562     G89 29    2  Other specified disorders of tendon, right ankle and foot  M67 873        Start Time: 1415  Stop Time: 1500  Total time in clinic (min): 45 minutes    Subjective: Patient reports that she continues to improve  Objective: See treatment diary below    Assessment: Patent completed all TE with no increase in pain  She is responding well to the current PT POC  Progress as able  Plan: Continue per plan of care  Precautions: HTN, history of PE      Manuals 20           Transverse friction R achilles RF SK JM           Manual R calf stretch RG SK JM           Posterior talar glide (III) RG SK            STM R plantar fascia RG SK JM           Neuro Re-Ed                                                                                                                Ther Ex              Step gastroc stretch 30sec x 3 30 sec x3 30 sec x 3                         Leg Press  (sled 7) 85#  3 x 12 85# 3x12 85#  3x12           Eccentric calf (R) strengthening 2 x 10 2x10 2x10           Multiphip flexion (do bilat) 25#  2 x 15 25# 2x15 25#  2x15           Multiphip ABD (do bilat) 25#  2 x 15 25# 2x15 25#  2x15                         Mini squats with ball squeeze 3 x 10 3x10 3x10           Long sit HS/gastroc stretch 30sec x 3 30"x3 30 sec  x 3           Ther Activity                                          Gait Training                                          Modalities              Iontophoresis with dexamethasone R achilles, wrapped + side medially 80 ma min 80 ma min 80 ma min

## 2020-08-31 ENCOUNTER — OFFICE VISIT (OUTPATIENT)
Dept: PHYSICAL THERAPY | Facility: CLINIC | Age: 55
End: 2020-08-31
Payer: COMMERCIAL

## 2020-08-31 DIAGNOSIS — G89.29 BILATERAL CHRONIC KNEE PAIN: Primary | ICD-10-CM

## 2020-08-31 DIAGNOSIS — M67.873 OTHER SPECIFIED DISORDERS OF TENDON, RIGHT ANKLE AND FOOT: ICD-10-CM

## 2020-08-31 DIAGNOSIS — M25.562 BILATERAL CHRONIC KNEE PAIN: Primary | ICD-10-CM

## 2020-08-31 DIAGNOSIS — M25.561 BILATERAL CHRONIC KNEE PAIN: Primary | ICD-10-CM

## 2020-08-31 PROCEDURE — 97140 MANUAL THERAPY 1/> REGIONS: CPT | Performed by: PHYSICAL THERAPIST

## 2020-08-31 PROCEDURE — 97110 THERAPEUTIC EXERCISES: CPT | Performed by: PHYSICAL THERAPIST

## 2020-08-31 NOTE — PROGRESS NOTES
Daily Note     Today's date: 2020  Patient name: Primitivo Bauer  : 1965  MRN: [id number]  Referring provider: Aisha Mckeon DPM  Dx:   Encounter Diagnosis     ICD-10-CM    1  Bilateral chronic knee pain  M25 561     M25 562     G89 29    2  Other specified disorders of tendon, right ankle and foot  M67 873                   Subjective: "I'm getting better " Noting minimal R achilles pain and improving bilateral knee pain  Finding therapy helpful  Objective: See treatment diary below  Progressed LE strengthening      Assessment: Tolerated treatment well  Patient demonstrated fatigue post treatment  Making progress towards goals  Patient would benefit from continued course of skilled physical therapy to address impairments in an effort to improve function  Plan: Continue per plan of care  Precautions: HTN, history of PE      Manuals 20          Transverse friction R achilles RF SK JM RG          Manual R calf stretch RG SK JASON RG          Posterior talar glide (III) RG SK  RG          STM R plantar fascia RG SK  RG          Neuro Re-Ed                                                                                                                Ther Ex              Step gastroc stretch 30sec x 3 30 sec x3 30 sec x 3 30sec x 3                        Leg Press  (sled 7) 85#  3 x 12 85# 3x12 85#  3x12 95# x 10  85# 2 x 10          Eccentric calf (R) strengthening 2 x 10 2x10 2x10 2 x 12          Multiphip flexion (do bilat) 25#  2 x 15 25# 2x15 25#  2x15 25#  2 x 15          Multiphip ABD (do bilat) 25#  2 x 15 25# 2x15 25#  2x15 25#  2 x 15                        Mini squats with ball squeeze 3 x 10 3x10 3x10 2 x 15          Long sit HS/gastroc stretch 30sec x 3 30"x3 30 sec  x 3 30sec x 3          CC TKEs    Pink  5sec x 20 each                        Ther Activity                                          Gait Training Modalities              Iontophoresis with dexamethasone R achilles, wrapped + side medially 80 ma min 80 ma min 80 ma min 80 ma min

## 2020-09-02 ENCOUNTER — OFFICE VISIT (OUTPATIENT)
Dept: SLEEP CENTER | Facility: CLINIC | Age: 55
End: 2020-09-02
Payer: COMMERCIAL

## 2020-09-02 VITALS
BODY MASS INDEX: 48.12 KG/M2 | OXYGEN SATURATION: 98 % | SYSTOLIC BLOOD PRESSURE: 128 MMHG | WEIGHT: 288.8 LBS | HEART RATE: 69 BPM | HEIGHT: 65 IN | DIASTOLIC BLOOD PRESSURE: 86 MMHG

## 2020-09-02 DIAGNOSIS — G47.33 OBSTRUCTIVE SLEEP APNEA TREATED WITH CONTINUOUS POSITIVE AIRWAY PRESSURE (CPAP): Primary | ICD-10-CM

## 2020-09-02 DIAGNOSIS — E66.01 MORBID OBESITY (HCC): ICD-10-CM

## 2020-09-02 DIAGNOSIS — Z99.89 OBSTRUCTIVE SLEEP APNEA TREATED WITH CONTINUOUS POSITIVE AIRWAY PRESSURE (CPAP): Primary | ICD-10-CM

## 2020-09-02 PROCEDURE — 99214 OFFICE O/P EST MOD 30 MIN: CPT | Performed by: NURSE PRACTITIONER

## 2020-09-02 PROCEDURE — 3074F SYST BP LT 130 MM HG: CPT | Performed by: NURSE PRACTITIONER

## 2020-09-02 PROCEDURE — 3079F DIAST BP 80-89 MM HG: CPT | Performed by: NURSE PRACTITIONER

## 2020-09-02 PROCEDURE — 1036F TOBACCO NON-USER: CPT | Performed by: NURSE PRACTITIONER

## 2020-09-02 NOTE — PROGRESS NOTES
Progress Note - [address] 48 y o  female   :1965, MRN: [id number]  2020          Follow Up Evaluation / Problem: Moderate to Severe Obstructive Sleep Apnea  Morbid Obesity      Thank you for the opportunity of participating in the evaluation and care of this patient in the Sleep Clinic at [facility]  HPI: Kyrie Estrada is a 48y o  year old female  The patient presents for follow up of obstructive sleep apnea  She was originally diagnosed with MAGGIE 9-10 years ago and more recently, completed a split night sleep study in May 2019  Results indicated moderate to severe MAGGIE with AHI of 22 3 and worsening during REM sleep  She has been using CPAP equipment, set at 9cm of water pressure since set up in 2019  She presents for a 6 month follow up to review compliance and effectiveness of treatment      Review of Systems      Genitourinary need to urinate more than twice a night and hot flashes at night   Cardiology none   Gastrointestinal frequent heartburn/acid reflux   Neurology none   Constitutional fatigue   Integumentary none   Psychiatry anxiety   Musculoskeletal joint pain, muscle aches, back pain and sciatica   Pulmonary none   ENT none   Endocrine none   Hematological none         Current Outpatient Medications:     ALPRAZolam (XANAX) 0 5 mg tablet, Take 1 tablet (0 5 mg total) by mouth daily at bedtime as needed for anxiety, Disp: 20 tablet, Rfl: 1    hydrochlorothiazide (HYDRODIURIL) 25 mg tablet, Take 25 mg by mouth, Disp: , Rfl:     SUMAtriptan (IMITREX) 100 mg tablet, Take 1 tablet (100 mg total) by mouth once as needed for migraine for up to 1 dose Pt requests BRAND NECCESSARY, Disp: 10 tablet, Rfl: 1    ergocalciferol (VITAMIN D2) 50,000 units, TAKE ONE CAPSULE BY MOUTH ONE TIME PER WEEK (Patient not taking: Reported on 2020), Disp: 12 capsule, Rfl: 0    montelukast (SINGULAIR) 10 mg tablet, TAKE 1 TABLET BY MOUTH EVERY DAY (Patient not taking: Reported on 9/2/2020), Disp: 30 tablet, Rfl: 0    Townsend Sleepiness Scale  Sitting and reading: High chance of dozing  Watching TV: High chance of dozing  Sitting, inactive in a public place (e g  a theatre or a meeting): Slight chance of dozing  As a passenger in a car for an hour without a break: Slight chance of dozing  Lying down to rest in the afternoon when circumstances permit: Slight chance of dozing  Sitting and talking to someone: Would never doze  Sitting quietly after a lunch without alcohol: Would never doze  In a car, while stopped for a few minutes in traffic: Would never doze  Total score: 9              Vitals:    09/02/20 1200   BP: 128/86   Pulse: 69   SpO2: 98%   Weight: 131 kg (288 lb 12 8 oz)   Height: 5' 5" (1 651 m)       Body mass index is 48 06 kg/m²  Neck Circumference: 15 5       EPWORTH SLEEPINESS SCORE  Total score: 9      Past History Since Last Sleep Center Visit:   She denies any significant changes to her health since her last visit  She feels that the pandemic quarantine has caused some changes to her sleep schedule as well as some difficulty with sleep, due to elevated stress levels  She has been more sedentary than usual during the pandemic as well  She reports that she uses her CPAP equipment most nights  On some nights, she falls asleep before she puts the mask on  She also notices symptoms of dry mouth  The patient reports that she has not received any supplies, such as masks, tubing, filters or head gear      The review of systems and following portions of the patient's history were reviewed and updated as appropriate: allergies, current medications, past family history, past medical history, past social history, past surgical history, and problem list         OBJECTIVE    PAP Pressure: Nasal CPAP set to deliver 9 cm of water pressure  Type of mask used: full face  DME Provider: [facility]    Physical Exam:     General Appearance:   Alert, cooperative, no distress, appears stated age, obese     Head:   Normocephalic, without obvious abnormality, atraumatic     Eyes:   PERRL, conjunctiva/corneas clear, EOM's intact          Nose:  Nares normal, septum midline, no drainage or sinus tenderness           Throat:  Lips, teeth and gums normal; tongue normal size and  shape and midline mucosa moist with low-lying soft palatal tissue, uvula not visualized, tonsils not visualized, Mallampati class 4       Neck:  Supple, symmetrical, trachea midline, no adenopathy; Thyroid: No enlargement, tenderness or nodules; no carotid bruit or JVD     Lungs:      Clear to auscultation bilaterally, respirations unlabored     Heart:   Regular rate and rhythm, S1 and S2 normal, no murmur, rub or gallop       Extremities:  Extremities normal, atraumatic, no cyanosis or edema       Skin:  Skin color, texture, turgor normal, no rashes or lesions       Neurologic:  No focal deficits noted       ASSESSMENT / PLAN    1  Obstructive sleep apnea treated with continuous positive airway pressure (CPAP)  PAP DME Resupply/Reorder   2  Morbid obesity ([address] )             Counseling / Coordination of Care  Total clinic time spent today 30 minutes  Greater than 50% of total time was spent with the patient and / or family counseling and / or coordination of care  A description of the counseling / coordination of care:     Impressions, Diagnostic results, Prognosis, Instructions for management, Risks and benefits of treatment, Patient and family education, Risk factor reductions and Importance of compliance with treatment    Today I reviewed the patient's compliance data  she has been able to use the equipment 60% of all days recorded  Average usage was 4 or more hours 56 7% of all days recorded  We discussed the importance of increasing use  The estimated AHI is 3 0 abnormal breathing events per hour    The patient feels they benefit from the use of PAP equipment and would like to continue PAP therapy  Response to treatment has been good  We discussed changing the heat/humidity settings of the equipment, which will help with dry mouth symptoms  She may also try Biotene or Xylimelt discs for oral dryness  Supplies have been ordered for the next year  It was recommended that she contact [facility] regarding replacement supplies  We discussed the importance of maintaining a consistent wake up time, which will help to improve her overall sleep/wake cycle  We also discussed increasing physical activity, which will help to promote better sleep patterns  She will continue using this equipment at the settings noted above for the next 12 months  At that timeshe will then return for a routine follow-up evaluation  I have asked the patient to contact the Sleep [facility] if she encounters any difficulties prior to that time  The following instructions have been given to the patient today:    Patient Instructions   1  Continue use of CPAP equipment nightly  2  Continue to clean your equipment, as discussed  3  Contact the Sleep [address] [place] [address] with any questions or concerns prior to your next visit, as needed  4  Schedule visit for follow-up in 1 year    You may consider trying Biotene oral rinse or consider changing your tubing settings to heat of 1 and humidity or 3 or 4  There discs called Xylimelt that can promote oral moisture as well  The mask you liked at the office is called ResMed F-30      Nursing Support:  When: Monday through Friday 7A-5PM except holidays  Where: Our direct line is [phone]  If you are having a true emergency please call 911  In the event that the line is busy or it is after hours please leave a voice message and we will return your call  Please speak clearly, leaving your full name, birth date, best number to reach you and the reason for your call     Medication refills: We will need the name of the medication, the dosage, the ordering provider, whether you get a 30 or 90 day refill, and the pharmacy name and address  Medications will be ordered by the provider only  Nurses cannot call in prescriptions  Please allow 7 days for medication refills  Physician requested updates: If your provider requested that you call with an update after starting medication, please be ready to provide us the medication and dosage, what time you take your medication, the time you attempt to fall asleep, time you fall asleep, when you wake up, and what time you get out of bed  Sleep Study Results: We will contact you with sleep study results and/or next steps after the physician has reviewed your testing            Joey Chester, [address] [place] [address]

## 2020-09-02 NOTE — PATIENT INSTRUCTIONS
1   Continue use of CPAP equipment nightly  2  Continue to clean your equipment, as discussed  3  Contact the Sleep [address] [place] [address] with any questions or concerns prior to your next visit, as needed  4  Schedule visit for follow-up in 1 year    You may consider trying Biotene oral rinse or consider changing your tubing settings to heat of 1 and humidity or 3 or 4  There discs called Xylimelt that can promote oral moisture as well  The mask you liked at the office is called ResMed F-30      Nursing Support:  When: Monday through Friday 7A-5PM except holidays  Where: Our direct line is [phone]  If you are having a true emergency please call 911  In the event that the line is busy or it is after hours please leave a voice message and we will return your call  Please speak clearly, leaving your full name, birth date, best number to reach you and the reason for your call  Medication refills: We will need the name of the medication, the dosage, the ordering provider, whether you get a 30 or 90 day refill, and the pharmacy name and address  Medications will be ordered by the provider only  Nurses cannot call in prescriptions  Please allow 7 days for medication refills  Physician requested updates: If your provider requested that you call with an update after starting medication, please be ready to provide us the medication and dosage, what time you take your medication, the time you attempt to fall asleep, time you fall asleep, when you wake up, and what time you get out of bed  Sleep Study Results: We will contact you with sleep study results and/or next steps after the physician has reviewed your testing

## 2020-09-03 ENCOUNTER — OFFICE VISIT (OUTPATIENT)
Dept: PHYSICAL THERAPY | Facility: CLINIC | Age: 55
End: 2020-09-03
Payer: COMMERCIAL

## 2020-09-03 ENCOUNTER — TELEPHONE (OUTPATIENT)
Dept: SLEEP CENTER | Facility: CLINIC | Age: 55
End: 2020-09-03

## 2020-09-03 DIAGNOSIS — M25.562 BILATERAL CHRONIC KNEE PAIN: Primary | ICD-10-CM

## 2020-09-03 DIAGNOSIS — G89.29 BILATERAL CHRONIC KNEE PAIN: Primary | ICD-10-CM

## 2020-09-03 DIAGNOSIS — M25.561 BILATERAL CHRONIC KNEE PAIN: Primary | ICD-10-CM

## 2020-09-03 DIAGNOSIS — M67.873 OTHER SPECIFIED DISORDERS OF TENDON, RIGHT ANKLE AND FOOT: ICD-10-CM

## 2020-09-03 PROCEDURE — 97140 MANUAL THERAPY 1/> REGIONS: CPT

## 2020-09-03 PROCEDURE — 97110 THERAPEUTIC EXERCISES: CPT

## 2020-09-03 NOTE — PROGRESS NOTES
Daily Note     Today's date: 9/3/2020  Patient name: Lonnie Pond  : 1965  MRN: [id number]  Referring provider: Alfreda Pond DPM  Dx:   Encounter Diagnosis     ICD-10-CM    1  Bilateral chronic knee pain  M25 561     M25 562     G89 29    2  Other specified disorders of tendon, right ankle and foot  M67 873                 Subjective: Patient reported R achilles and bilateral knees are feeling better  Has been having some muscle cramping in R LE but she believes this is mostly from being dehydrated  Objective: See treatment diary below  Assessment: Continued restrictions in achilles limiting more functional DF  No increase in symptoms reported during current program  Last of Dexamethasone used today so will discontinue NV  Plan: Continue per plan of care  Precautions: HTN, history of PE      Manuals 8/17/20 8/24 8/28 8/31 9/3         Transverse friction R achilles RF SK  RG          Manual R calf stretch RG SK  RG          Posterior talar glide (III) RG SK  RG NP         STM R plantar fascia RG [facility] RG          Neuro Re-Ed                                                                                                                Ther Ex              Step gastroc stretch 30sec x 3 30 sec x3 30 sec x 3 30sec x 3 30"x3         Leg Press  (sled 7) 85#  3 x 12 85# 3x12 85#  3x12 95# x 10  85# 2 x 10 95#  x10, 85#  2x10         Eccentric calf (R) strengthening 2 x 10 2x10 2x10 2 x 12 2x12         Multiphip flexion (do bilat) 25#  2 x 15 25# 2x15 25#  2x15 25#  2 x 15 25#  2x15         Multiphip ABD (do bilat) 25#  2 x 15 25# 2x15 25#  2x15 25#  2 x 15 25#  2x15         Mini squats with ball squeeze 3 x 10 3x10 3x10 2 x 15 2x15         Long sit HS/gastroc stretch 30sec x 3 30"x3 30 sec  x 3 30sec x 3  30"x3         CC TKEs    Pink  5sec x 20 each  Pink   5"x20  bilat                       Ther Activity                                          Gait Training Modalities              Iontophoresis with dexamethasone R achilles, wrapped + side medially 80 ma min 80 ma min 80 ma min 80 ma min 80 ma min D/C

## 2020-09-10 ENCOUNTER — OFFICE VISIT (OUTPATIENT)
Dept: PHYSICAL THERAPY | Facility: CLINIC | Age: 55
End: 2020-09-10
Payer: COMMERCIAL

## 2020-09-10 DIAGNOSIS — M25.561 BILATERAL CHRONIC KNEE PAIN: Primary | ICD-10-CM

## 2020-09-10 DIAGNOSIS — M67.873 OTHER SPECIFIED DISORDERS OF TENDON, RIGHT ANKLE AND FOOT: ICD-10-CM

## 2020-09-10 DIAGNOSIS — G89.29 BILATERAL CHRONIC KNEE PAIN: Primary | ICD-10-CM

## 2020-09-10 DIAGNOSIS — M25.562 BILATERAL CHRONIC KNEE PAIN: Primary | ICD-10-CM

## 2020-09-10 PROCEDURE — 97110 THERAPEUTIC EXERCISES: CPT | Performed by: PHYSICAL THERAPIST

## 2020-09-10 PROCEDURE — 97140 MANUAL THERAPY 1/> REGIONS: CPT | Performed by: PHYSICAL THERAPIST

## 2020-09-10 NOTE — PROGRESS NOTES
Daily Note     Today's date: 9/10/2020  Patient name: Gracia Krueger  : 1965  MRN: [id number]  Referring provider: Eyal Meredith DPM  Dx:   Encounter Diagnosis     ICD-10-CM    1  Bilateral chronic knee pain  M25 561     M25 562     G89 29    2  Other specified disorders of tendon, right ankle and foot  M67 873        Start Time: 1216  Stop Time: 1310  Total time in clinic (min): 54 minutes    Subjective: 1/10 R achilles pain, denies knee pain  Feels legs are getting stronger Knees still ache after increased periods on feet  Objective: See treatment diary below   Progressed LE strengthening program   No point tenderness at R achilles  Assessment: Tolerated progressions well  Overall making good progress towards goals  Still some unresolved pain and weakness negatively effecting quality of life and function  Plan: Continue per plan of care  Reduce frequency to once a week  Precautions: HTN, history of PE      Manuals 8/17/20 8/24 8/28 8/31 9/3 9/10        Transverse friction R achilles RF SK  RG  RG        Manual R calf stretch RG SK  RG  RG        Posterior talar glide (III) RG SK  RG NP RG        STM R plantar fascia RG SK  RG  RG        Neuro Re-Ed                                                                                                                Ther Ex              Step gastroc stretch 30sec x 3 30 sec x3 30 sec x 3 30sec x 3 30"x3 30sec x 3        Leg Press  (sled 7) 85#  3 x 12 85# 3x12 85#  3x12 95# x 10  85# 2 x 10 95#  x10, 85#  2x10 95#  2 x 10  85# 1x 10        Eccentric calf (R) strengthening 2 x 10 2x10 2x10 2 x 12 2x12 2 x 12        Multiphip flexion (do bilat) 25#  2 x 15 25# 2x15 25#  2x15 25#  2 x 15 25#  2x15 40#  2 x 15        Multiphip ABD (do bilat) 25#  2 x 15 25# 2x15 25#  2x15 25#  2 x 15 25#  2x15 25#  2 x 15        Mini squats with ball squeeze 3 x 10 3x10 3x10 2 x 15 2x15 2 x 15        Long sit HS/gastroc stretch 30sec x 3 30"x3 30 sec  x 3 30sec x 3  30"x3 30sec x 3        CC TKEs    Pink  5sec x 20 each  Pink   5"x20  bilat Pink  5sec x 30                      Ther Activity                                          Gait Training                                          Modalities              Iontophoresis with dexamethasone R achilles, wrapped + side medially 80 ma min 80 ma min 80 ma min 80 ma min 80 ma min D/C

## 2020-09-14 ENCOUNTER — APPOINTMENT (OUTPATIENT)
Dept: PHYSICAL THERAPY | Facility: CLINIC | Age: 55
End: 2020-09-14
Payer: COMMERCIAL

## 2020-09-18 ENCOUNTER — APPOINTMENT (OUTPATIENT)
Dept: PHYSICAL THERAPY | Facility: CLINIC | Age: 55
End: 2020-09-18
Payer: COMMERCIAL

## 2020-09-21 ENCOUNTER — APPOINTMENT (OUTPATIENT)
Dept: PHYSICAL THERAPY | Facility: CLINIC | Age: 55
End: 2020-09-21
Payer: COMMERCIAL

## 2020-09-24 ENCOUNTER — APPOINTMENT (OUTPATIENT)
Dept: PHYSICAL THERAPY | Facility: CLINIC | Age: 55
End: 2020-09-24
Payer: COMMERCIAL

## 2020-10-02 ENCOUNTER — OFFICE VISIT (OUTPATIENT)
Dept: PHYSICAL THERAPY | Facility: CLINIC | Age: 55
End: 2020-10-02
Payer: COMMERCIAL

## 2020-10-02 DIAGNOSIS — M25.561 BILATERAL CHRONIC KNEE PAIN: Primary | ICD-10-CM

## 2020-10-02 DIAGNOSIS — M25.562 BILATERAL CHRONIC KNEE PAIN: Primary | ICD-10-CM

## 2020-10-02 DIAGNOSIS — G89.29 BILATERAL CHRONIC KNEE PAIN: Primary | ICD-10-CM

## 2020-10-02 DIAGNOSIS — M67.873 OTHER SPECIFIED DISORDERS OF TENDON, RIGHT ANKLE AND FOOT: ICD-10-CM

## 2020-10-02 PROCEDURE — 97140 MANUAL THERAPY 1/> REGIONS: CPT | Performed by: PHYSICAL THERAPIST

## 2020-10-02 PROCEDURE — 97110 THERAPEUTIC EXERCISES: CPT | Performed by: PHYSICAL THERAPIST

## 2020-10-16 ENCOUNTER — APPOINTMENT (OUTPATIENT)
Dept: PHYSICAL THERAPY | Facility: CLINIC | Age: 55
End: 2020-10-16
Payer: COMMERCIAL

## 2020-11-06 ENCOUNTER — TELEPHONE (OUTPATIENT)
Dept: OBGYN CLINIC | Facility: HOSPITAL | Age: 55
End: 2020-11-06

## 2020-12-09 ENCOUNTER — DOCUMENTATION (OUTPATIENT)
Dept: OBGYN CLINIC | Facility: HOSPITAL | Age: 55
End: 2020-12-09

## 2020-12-18 ENCOUNTER — OFFICE VISIT (OUTPATIENT)
Dept: OBGYN CLINIC | Facility: MEDICAL CENTER | Age: 55
End: 2020-12-18
Payer: COMMERCIAL

## 2020-12-18 VITALS
HEIGHT: 65 IN | SYSTOLIC BLOOD PRESSURE: 146 MMHG | BODY MASS INDEX: 47.98 KG/M2 | HEART RATE: 62 BPM | WEIGHT: 288 LBS | DIASTOLIC BLOOD PRESSURE: 108 MMHG

## 2020-12-18 DIAGNOSIS — M17.0 PRIMARY OSTEOARTHRITIS OF BOTH KNEES: Primary | ICD-10-CM

## 2020-12-18 PROCEDURE — 20610 DRAIN/INJ JOINT/BURSA W/O US: CPT | Performed by: PHYSICIAN ASSISTANT

## 2020-12-18 RX ORDER — BUPIVACAINE HYDROCHLORIDE 2.5 MG/ML
4 INJECTION, SOLUTION INFILTRATION; PERINEURAL
Status: COMPLETED | OUTPATIENT
Start: 2020-12-18 | End: 2020-12-18

## 2020-12-18 RX ADMIN — BUPIVACAINE HYDROCHLORIDE 4 ML: 2.5 INJECTION, SOLUTION INFILTRATION; PERINEURAL at 12:17

## 2021-03-19 ENCOUNTER — OFFICE VISIT (OUTPATIENT)
Dept: OBGYN CLINIC | Facility: MEDICAL CENTER | Age: 56
End: 2021-03-19
Payer: COMMERCIAL

## 2021-03-19 VITALS — BODY MASS INDEX: 47.98 KG/M2 | HEIGHT: 65 IN | WEIGHT: 288 LBS

## 2021-03-19 DIAGNOSIS — M17.0 PRIMARY OSTEOARTHRITIS OF BOTH KNEES: Primary | ICD-10-CM

## 2021-03-19 PROCEDURE — 99213 OFFICE O/P EST LOW 20 MIN: CPT | Performed by: ORTHOPAEDIC SURGERY

## 2021-03-19 PROCEDURE — 1036F TOBACCO NON-USER: CPT | Performed by: ORTHOPAEDIC SURGERY

## 2021-03-19 PROCEDURE — 3008F BODY MASS INDEX DOCD: CPT | Performed by: ORTHOPAEDIC SURGERY

## 2021-03-19 NOTE — PROGRESS NOTES
Orthopaedic Surgery - Office Note  Rosemary Gunn (8 y o  female)   : 1965   MRN: [id number]  Encounter Date: 3/19/2021    Chief Complaint   Patient presents with    Left Knee - Follow-up    Right Knee - Follow-up       Assessment / Plan  Bilateral knee osteoarthritis     · We did again review the treatment options for osteoarthritis of the knees  Since she had significant relief with the Synvisc 1 injections at this time she will continue with conservative treatment  · Continue Voltaren gel, ice and heat for pain relief  · Activity as tolerated  · Continue with home exercise program   Return if symptoms worsen or fail to improve  She knows that after 2021 if her pain returns we can reconsider repeat viscosupplementation injections at that time  We would consider steroid injections if she would need intervention prior to this  History of Present Illness  Rosemary Gunn is a 50 y o  female who presents for follow-up evaluation of bilateral knee pain  She did undergo a Synvisc-One injection bilaterally last visit on 2020 and feels that improved her symptoms significantly  She has continued with conservative treatment since but overall feels that she is able to tolerate at all activities better  She has continued with her home exercise program  She denies any further injury or trauma to her bilateral knees  She denies any distal paresthesias  Review of Systems  Pertinent items are noted in HPI  All other systems were reviewed and are negative  Physical Exam  Ht 5' 5" (1 651 m)   Wt 131 kg (288 lb)   BMI 47 93 kg/m²   Cons: Appears well  No apparent distress  Psych: Alert  Oriented x3  Mood and affect normal   Eyes: PERRLA, EOMI  Resp: Normal effort  No audible wheezing or stridor  CV: Palpable pulse  No discernable arrhythmia  Trace LE edema  Lymph:  No palpable cervical, axillary, or inguinal lymphadenopathy  Skin: Warm  No palpable masses    No visible lesions  Neuro: Normal muscle tone  Normal and symmetric DTR's  Bilateral Knee Exam  Alignment:  Normal knee alignment  Inspection:  No swelling  Palpation:  No tenderness  ROM:  Knee Extension 0  Knee Flexion 115  Strength:  5/5 quadriceps and hamstrings  Able to actively extend knee against gravity  Stability:  No objective knee instability  Stable Varus / Valgus stress, Lachman, and Posterior drawer  Tests:  (-) Diego  Patella:  Normal patellar mobility  Neurovascular:  Sensation intact in DP/SP/Duval/Sa/T nerve distributions  2+ DP & PT pulses  Gait:  Normal     Studies Reviewed  I personally reviewed x-rays of bilateral knee obtained on February 12, 2019 which demonstrated mild bilateral knee osteoarthritis    Procedures  No procedures today  Medical, Surgical, Family, and Social History  The patient's medical history, family history, and social history, were reviewed and updated as appropriate      Past Medical History:   Diagnosis Date    Constipation     Hay fever     Pulmonary embolism (HCC)        Past Surgical History:   Procedure Laterality Date    HYSTERECTOMY  2013    MYOMECTOMY  1994    REDUCTION MAMMAPLASTY  2011       Family History   Problem Relation Age of Onset    Hypertension Mother    Glencross Damien Rheum arthritis Mother     Prostate cancer Father     Hypertension Father     Heart attack Father     Glaucoma Father     Hypertension Maternal Grandmother     Stroke Maternal Grandmother     Heart attack Paternal Grandmother        Social History     Occupational History    Not on file   Tobacco Use    Smoking status: Never Smoker    Smokeless tobacco: Never Used   Substance and Sexual Activity    Alcohol use: Yes     Frequency: Monthly or less     Comment: socially    Drug use: No    Sexual activity: Not on file       Allergies   Allergen Reactions    Seasonal Ic  [Cholestatin]          Current Outpatient Medications:     ALPRAZolam (XANAX) 0 5 mg tablet, Take 1 tablet (0 5 mg total) by mouth daily at bedtime as needed for anxiety, Disp: 20 tablet, Rfl: 1    ergocalciferol (VITAMIN D2) 50,000 units, TAKE ONE CAPSULE BY MOUTH ONE TIME PER WEEK, Disp: 12 capsule, Rfl: 0    hydrochlorothiazide (HYDRODIURIL) 25 mg tablet, Take 25 mg by mouth, Disp: , Rfl:     montelukast (SINGULAIR) 10 mg tablet, TAKE 1 TABLET BY MOUTH EVERY DAY, Disp: 30 tablet, Rfl: 0    sertraline (ZOLOFT) 50 mg tablet, Take 50 mg by mouth daily, Disp: , Rfl:     SUMAtriptan (IMITREX) 100 mg tablet, Take 1 tablet (100 mg total) by mouth once as needed for migraine for up to 1 dose Pt requests BRAND NECCESSARY, Disp: 10 tablet, Rfl: 1      Zorita PillingMINAL    Scribe Attestation    I,:   am acting as a scribe while in the presence of the attending physician :       I,:   personally performed the services described in this documentation    as scribed in my presence :

## 2021-09-02 ENCOUNTER — OFFICE VISIT (OUTPATIENT)
Dept: SLEEP CENTER | Facility: CLINIC | Age: 56
End: 2021-09-02
Payer: COMMERCIAL

## 2021-09-02 VITALS
DIASTOLIC BLOOD PRESSURE: 78 MMHG | WEIGHT: 293 LBS | BODY MASS INDEX: 48.82 KG/M2 | SYSTOLIC BLOOD PRESSURE: 130 MMHG | HEIGHT: 65 IN

## 2021-09-02 DIAGNOSIS — Z99.89 OBSTRUCTIVE SLEEP APNEA TREATED WITH CONTINUOUS POSITIVE AIRWAY PRESSURE (CPAP): Primary | ICD-10-CM

## 2021-09-02 DIAGNOSIS — G47.33 OBSTRUCTIVE SLEEP APNEA TREATED WITH CONTINUOUS POSITIVE AIRWAY PRESSURE (CPAP): Primary | ICD-10-CM

## 2021-09-02 PROCEDURE — 99214 OFFICE O/P EST MOD 30 MIN: CPT | Performed by: NURSE PRACTITIONER

## 2021-09-02 NOTE — PATIENT INSTRUCTIONS
1   Continue use of CPAP equipment nightly, at your discretion  2  Continue to clean your equipment, as discussed  3  Contact the Sleep [address] [place] [address] with any questions or concerns prior to your next visit, as needed  4  Schedule visit for follow-up in 1 year    Do not use ozone  with your CPAP    Nursing Support:  When: Monday through Friday 7A-5PM except holidays  Where: Our direct line is [phone]  If you are having a true emergency please call 911  In the event that the line is busy or it is after hours please leave a voice message and we will return your call  Please speak clearly, leaving your full name, birth date, best number to reach you and the reason for your call  Medication refills: We will need the name of the medication, the dosage, the ordering provider, whether you get a 30 or 90 day refill, and the pharmacy name and address  Medications will be ordered by the provider only  Nurses cannot call in prescriptions  Please allow 7 days for medication refills  Physician requested updates: If your provider requested that you call with an update after starting medication, please be ready to provide us the medication and dosage, what time you take your medication, the time you attempt to fall asleep, time you fall asleep, when you wake up, and what time you get out of bed  Sleep Study Results: We will contact you with sleep study results and/or next steps after the physician has reviewed your testing

## 2021-09-02 NOTE — PROGRESS NOTES
Progress Note - [address] 50 y o  female   :1965, MRN: [id number]  2021        Follow Up Evaluation / Problem: Moderate to Severe Obstructive Sleep Apnea  Morbid Obesity      Thank you for the opportunity of participating in the evaluation and care of this patient in the Sleep Clinic at [facility]  HPI: Cristina Guan is a 50y o  year old female  The patient presents for follow up of moderate to severe obstructive sleep apnea  She was originally diagnosed with MAGGIE at least 10 years ago and more recently, completed a split night sleep study in May 2019  Results confirmed moderate to severe MAGGIE with AHI of 22 3 and worsening during REM sleep  She has been using CPAP equipment, set at 9cm of water pressure since set up in 2019  She presents for annual follow up to review compliance and effectiveness of treatment        Review of Systems    Genitourinary need to urinate more than twice a night and hot flashes at night   Cardiology ankle/leg swelling   Gastrointestinal none   Neurology awaken with headache, numbness/tingling of an extremity, forgetfulness and poor concentration or confusion,    Constitutional fatigue and weight change   Integumentary rash or dry skin and itching   Psychiatry anxiety and depression   Musculoskeletal joint pain, muscle aches, back pain and sciatica   Pulmonary difficulty breathing when lying flat    ENT none   Endocrine none   Hematological none       Current Outpatient Medications:     ALPRAZolam (XANAX) 0 5 mg tablet, Take 1 tablet (0 5 mg total) by mouth daily at bedtime as needed for anxiety, Disp: 20 tablet, Rfl: 1    ergocalciferol (VITAMIN D2) 50,000 units, TAKE ONE CAPSULE BY MOUTH ONE TIME PER WEEK, Disp: 12 capsule, Rfl: 0    hydrochlorothiazide (HYDRODIURIL) 25 mg tablet, Take 25 mg by mouth, Disp: , Rfl:     montelukast (SINGULAIR) 10 mg tablet, TAKE 1 TABLET BY MOUTH EVERY DAY, Disp: 30 tablet, Rfl: 0    sertraline (ZOLOFT) 50 mg tablet, Take 50 mg by mouth daily, Disp: , Rfl:     SUMAtriptan (IMITREX) 100 mg tablet, Take 1 tablet (100 mg total) by mouth once as needed for migraine for up to 1 dose Pt requests BRAND NECCESSARY, Disp: 10 tablet, Rfl: 1    [facility] Sleepiness Scale  Sitting and reading: Would never doze  Watching TV: High chance of dozing  Sitting, inactive in a public place (e g  a theatre or a meeting): Slight chance of dozing  As a passenger in a car for an hour without a break: Slight chance of dozing  Lying down to rest in the afternoon when circumstances permit: Moderate chance of dozing  Sitting and talking to someone: Would never doze  Sitting quietly after a lunch without alcohol: Slight chance of dozing  In a car, while stopped for a few minutes in traffic: Would never doze  Total score: 8              Vitals:    09/02/21 1200   BP: 130/78   Weight: 135 kg (297 lb)   Height: 5' 5" (1 651 m)       Body mass index is 49 42 kg/m²  Neck Circumference: 16       EPWORTH SLEEPINESS SCORE  Total score: 8      Past History Since Last Sleep Center Visit:   She denies any significant changes to her health since her last visit  She developed some depression due to the pandemic and is currently taking medication  She reports that she uses her CPAP equipment most nights  She feels the equipment improves her sleep and she would like to continue using it  The patient's CPAP equipment has been recalled  The patient has used an ozone  to clean the equipment  The patient has had a non-productive cough, but is unsure if she can attribute it to use of the CPAP  Cough is intermittent and not associated with any other symptoms  She is unsure when the cough started         The review of systems and following portions of the patient's history were reviewed and updated as appropriate: allergies, current medications, past family history, past medical history, past social history, past surgical history, and problem list         OBJECTIVE  Equipment set up:  12/2019  PAP Pressure: Nasal CPAP set to deliver 9 cm of water pressure  Type of mask used: full face  DME Provider: [facility]    Physical Exam:     General Appearance:   Alert, cooperative, no distress, appears stated age, obese     Head:   Normocephalic, without obvious abnormality, atraumatic     Eyes:   PERRL, conjunctiva/corneas clear, EOM's intact          Nose:  Nares normal, septum midline, no drainage or sinus tenderness           Throat:  Lips, teeth and gums normal; tongue normal size and  shape and midline mucosa moist with low-lying soft palatal tissue, uvula not visualized, tonsils not visualized, Mallampati class 4       Neck:  Supple, symmetrical, trachea midline, no adenopathy; Thyroid: No enlargement, tenderness or nodules; no carotid bruit or JVD     Lungs:      Clear to auscultation bilaterally, respirations unlabored     Heart:   Regular rate and rhythm, S1 and S2 normal, no murmur, rub or gallop       Extremities:  Extremities normal, atraumatic, no cyanosis or edema       Skin:  Skin color, texture, turgor normal, no rashes or lesions       Neurologic:  No focal deficits noted       ASSESSMENT / PLAN    1  Obstructive sleep apnea treated with continuous positive airway pressure (CPAP)  PAP DME Resupply/Reorder           Counseling / Coordination of Care  Total clinic time spent today 30 minutes  Greater than 50% of total time was spent with the patient and / or family counseling and / or coordination of care  A description of the counseling / coordination of care:     Impressions, Diagnostic results, Prognosis, Instructions for management, Risks and benefits of treatment, Patient and family education, Risk factor reductions and Importance of compliance with treatment    Today I reviewed the patient's compliance data    she has been able to use the equipment 57 8% of all days recorded  Average usage was 4 or more hours 54 4% of all days recorded  The estimated AHI is 4 9 abnormal breathing events per hour  The patient feels they benefit from the use of PAP equipment and would like to continue PAP therapy  Response to treatment has been good  A prescription for supplies has been provided for the next year  It was recommended that she contact Rachelle Muñoz regarding replacement supplies  We discussed the recent recall of the patient's PAP equipment  The risks and benefits for continued use vs  Discontinuation of PAP therapy were discussed  It is ultimately the patient's decision whether or not to continue PAP therapy at this time  The patient was advised to register their equipment on the [employer], which will give them further direction in the future replacement of the equipment  Since the ozone cleaning devices have been suspected as a causative agent in the recall, the patient has been advised to avoid using any ozone cleaning device and clean the equipment using mild soap and water  She is unsure if she will continue using this equipment at the settings noted above, due to concerns regarding the recall  She will await notification from the [employer] regarding replacement of her equipment  She will schedule a routine follow-up evaluation in one year  I have asked the patient to contact the [address] if she encounters any difficulties prior to that time  The following instructions have been given to the patient today:    Patient Instructions   1  Continue use of CPAP equipment nightly, at your discretion  2  Continue to clean your equipment, as discussed  3  Contact the [address] with any questions or concerns prior to your next visit, as needed  4    Schedule visit for follow-up in 1 year    Do not use ozone  with your CPAP    Nursing Support:  When: Monday through Friday 7A-5PM except holidays  Where: Our direct line is [phone]  If you are having a true emergency please call 911  In the event that the line is busy or it is after hours please leave a voice message and we will return your call  Please speak clearly, leaving your full name, birth date, best number to reach you and the reason for your call  Medication refills: We will need the name of the medication, the dosage, the ordering provider, whether you get a 30 or 90 day refill, and the pharmacy name and address  Medications will be ordered by the provider only  Nurses cannot call in prescriptions  Please allow 7 days for medication refills  Physician requested updates: If your provider requested that you call with an update after starting medication, please be ready to provide us the medication and dosage, what time you take your medication, the time you attempt to fall asleep, time you fall asleep, when you wake up, and what time you get out of bed  Sleep Study Results: We will contact you with sleep study results and/or next steps after the physician has reviewed your testing        Keanu Keenan [address] [place] [address]

## 2021-09-03 ENCOUNTER — TELEPHONE (OUTPATIENT)
Dept: SLEEP CENTER | Facility: CLINIC | Age: 56
End: 2021-09-03

## 2022-01-19 ENCOUNTER — TELEPHONE (OUTPATIENT)
Dept: OBGYN CLINIC | Facility: HOSPITAL | Age: 57
End: 2022-01-19

## 2022-02-01 ENCOUNTER — OFFICE VISIT (OUTPATIENT)
Dept: OBGYN CLINIC | Facility: MEDICAL CENTER | Age: 57
End: 2022-02-01

## 2022-02-01 VITALS
TEMPERATURE: 97.5 F | HEART RATE: 74 BPM | SYSTOLIC BLOOD PRESSURE: 139 MMHG | BODY MASS INDEX: 48.82 KG/M2 | DIASTOLIC BLOOD PRESSURE: 70 MMHG | HEIGHT: 65 IN | WEIGHT: 293 LBS

## 2022-02-01 DIAGNOSIS — M17.11 PRIMARY OSTEOARTHRITIS OF RIGHT KNEE: ICD-10-CM

## 2022-02-01 DIAGNOSIS — M17.0 PRIMARY OSTEOARTHRITIS OF BOTH KNEES: Primary | ICD-10-CM

## 2022-02-01 PROCEDURE — 99213 OFFICE O/P EST LOW 20 MIN: CPT | Performed by: ORTHOPAEDIC SURGERY

## 2022-02-01 NOTE — PROGRESS NOTES
Orthopaedic Surgery - Office Note  Panfilo Salinas (89 y o  female)   : 1965   MRN: [id number]  Encounter Date: 2022    Chief Complaint   Patient presents with    Right Knee - Follow-up    Left Knee - Follow-up       Assessment / Plan  Bilateral knee osteoarthritis     · We did again review the treatment options for osteoarthritis of the knees  Since she had significant relief with the Synvisc 1 injections in the past and at this time we would like to order a repeat Synvisc-One injection for her right knee since that is the 1 that is symptomatic at this time  · Continue with NSAIDs, ice and heat for pain relief  · Activity as tolerated  · Patient was provided with a script for PT for both knees to relearn her hip and thigh strengthening exercises  · Return for Follow-up when authorization for viscosupplementation for the right knee is received  Kris Gutierrez History of Present Illness  Panfilo Salinas is a 49 y o  female following up for bilateral knee osteoarthritis  Her right knee is symptomatic at this time as she did have a fall backwards  She did not twist or land on her right knee but she did twist and sprained her left ankle which has caused her put more stress on her right knee  She has been using tape on her knee which she feels did help shortly but then the glue started irritating her skin  She has also been using anti-inflammatories with little relief  She feels that the knee pain continues to worsen and is affecting her sleep at this time  Most of her pain at this time is anteriorly around the patella  This does affect her going up and down stairs which seems to aggravated and also she has trouble kneeling on that knee  She also has some medial joint line pain but feels that this is different  She is denying any catching or locking in her knee  She did undergo a Synvisc-One injection bilaterally last visit on 2020 and feels that improved her symptoms significantly   She has continued with her home exercise program up to when her knee pain got worse that has limited her  She denies any further injury or trauma to her bilateral knees  She denies any distal paresthesias  Review of Systems  Pertinent items are noted in HPI  All other systems were reviewed and are negative  Physical Exam  /70   Pulse 74   Temp 97 5 °F (36 4 °C)   Ht 5' 5" (1 651 m)   Wt 136 kg (299 lb 9 6 oz)   BMI 49 86 kg/m²   Cons: Appears well  No apparent distress  Psych: Alert  Oriented x3  Mood and affect normal   Eyes: PERRLA, EOMI  Resp: Normal effort  No audible wheezing or stridor  CV: Palpable pulse  No discernable arrhythmia  Trace LE edema  Lymph:  No palpable cervical, axillary, or inguinal lymphadenopathy  Skin: Warm  No palpable masses  No visible lesions  Neuro: Normal muscle tone  Normal and symmetric DTR's  Bilateral Knee Exam  Alignment:  Normal knee alignment  Inspection:  No swelling  Palpation:  Moderate tenderness at Medial joint line of the right knee  Mild tenderness around the patella of the right knee  No tenderness of the left knee  ROM:  Knee Extension 0  Knee Flexion 115  Strength:  5/5 quadriceps and hamstrings  Able to actively extend knee against gravity  Stability:  No objective knee instability  Stable Varus / Valgus stress, Lachman, and Posterior drawer  Tests:  (-) Diego  Patella:  Patella tracks centrally with crepitus  Neurovascular:  Sensation intact in DP/SP/Duval/Sa/T nerve distributions  2+ DP & PT pulses  Gait:  Antalgic  Studies Reviewed  I personally reviewed x-rays of bilateral knee obtained on February 12, 2019 which demonstrated mild bilateral knee osteoarthritis    Procedures  No procedures today  Medical, Surgical, Family, and Social History  The patient's medical history, family history, and social history, were reviewed and updated as appropriate      Past Medical History:   Diagnosis Date    Constipation     Hay fever     Pulmonary embolism ([facility] [address] )        Past Surgical History:   Procedure Laterality Date    HYSTERECTOMY  2013    MYOMECTOMY  1994    REDUCTION MAMMAPLASTY  2011       Family History   Problem Relation Age of Onset    Hypertension Mother    Jose Polio Rheum arthritis Mother     Prostate cancer Father     Hypertension Father     Heart attack Father     Glaucoma Father     Hypertension Maternal Grandmother     Stroke Maternal Grandmother     Heart attack Paternal Grandmother        Social History     Occupational History    Not on file   Tobacco Use    Smoking status: Never Smoker    Smokeless tobacco: Never Used   Vaping Use    Vaping Use: Never used   Substance and Sexual Activity    Alcohol use: Yes     Comment: socially    Drug use: No    Sexual activity: Not on file       Allergies   Allergen Reactions    Seasonal Ic  [Cholestatin]          Current Outpatient Medications:     ALPRAZolam (XANAX) 0 5 mg tablet, Take 1 tablet (0 5 mg total) by mouth daily at bedtime as needed for anxiety, Disp: 20 tablet, Rfl: 1    ergocalciferol (VITAMIN D2) 50,000 units, TAKE ONE CAPSULE BY MOUTH ONE TIME PER WEEK, Disp: 12 capsule, Rfl: 0    hydrochlorothiazide (HYDRODIURIL) 25 mg tablet, Take 25 mg by mouth, Disp: , Rfl:     montelukast (SINGULAIR) 10 mg tablet, TAKE 1 TABLET BY MOUTH EVERY DAY, Disp: 30 tablet, Rfl: 0    sertraline (ZOLOFT) 50 mg tablet, Take 50 mg by mouth daily, Disp: , Rfl:     SUMAtriptan (IMITREX) 100 mg tablet, Take 1 tablet (100 mg total) by mouth once as needed for migraine for up to 1 dose Pt requests BRAND NECCESSARY, Disp: 10 tablet, Rfl: 1      Mary Bowie PA-C    Scribe Attestation    I,:   am acting as a scribe while in the presence of the attending physician :       I,:   personally performed the services described in this documentation    as scribed in my presence :

## 2022-02-10 ENCOUNTER — TELEPHONE (OUTPATIENT)
Dept: OBGYN CLINIC | Facility: HOSPITAL | Age: 57
End: 2022-02-10

## 2022-02-10 NOTE — TELEPHONE ENCOUNTER
DR Hale Walston  RE: KNEE BRACE  CB: [phone]    Patient called the call center regarding her knee brace not fitting properly and would like to exchange for different size

## 2022-02-10 NOTE — TELEPHONE ENCOUNTER
Lm for patient stating that I sent a message to the DME fitter and they should be in contact with her soon

## 2022-03-08 ENCOUNTER — PROCEDURE VISIT (OUTPATIENT)
Dept: OBGYN CLINIC | Facility: MEDICAL CENTER | Age: 57
End: 2022-03-08
Payer: COMMERCIAL

## 2022-03-08 VITALS — WEIGHT: 293 LBS | HEIGHT: 65 IN | BODY MASS INDEX: 48.82 KG/M2

## 2022-03-08 DIAGNOSIS — M17.0 PRIMARY OSTEOARTHRITIS OF BOTH KNEES: Primary | ICD-10-CM

## 2022-03-08 PROCEDURE — 20610 DRAIN/INJ JOINT/BURSA W/O US: CPT | Performed by: PHYSICIAN ASSISTANT

## 2022-03-08 RX ORDER — BUPIVACAINE HYDROCHLORIDE 5 MG/ML
6 INJECTION, SOLUTION EPIDURAL; INTRACAUDAL
Status: COMPLETED | OUTPATIENT
Start: 2022-03-08 | End: 2022-03-08

## 2022-03-08 RX ADMIN — BUPIVACAINE HYDROCHLORIDE 6 ML: 5 INJECTION, SOLUTION EPIDURAL; INTRACAUDAL at 15:27

## 2022-03-08 NOTE — PROGRESS NOTES
Orthopaedic Surgery - Office Note  Lonnie Pond (89 y o  female)   : 1965   MRN: [id number]  Encounter Date: 3/8/2022    Chief Complaint   Patient presents with    Right Knee - Follow-up       Assessment / Plan  Bilateral knee osteoarthritis     · After discussion of risk and benefits the patient agreed to proceed with a right knee Synvisc-One injection  This was injected and prepared sterilely and tolerated well  · Continue to progress activity as tolerated for both knees  · For her left knee she will let us know if she would like to proceed with viscosupplementation versus steroid injections in the future  · Continue with NSAIDs, ice and heat for pain relief  · Continue with home exercise program  · Return if symptoms worsen or fail to improve  History of Present Illness  Lonnie Pond is a 49 y o  female following up for bilateral knee osteoarthritis  She has continued with right knee pain worse than her left at this time  She is coming in today for viscosupplementation of the right knee  Most of her pain at this time is anteriorly around the patella  This does affect her going up and down stairs which seems to aggravated and also she has trouble kneeling on that knee  She also has some medial joint line pain but feels that this is different  She is denying any catching or locking in her knee  She did undergo a Synvisc-One injection bilaterally last visit on 2020 and feels that improved her symptoms significantly  She has continued with her home exercise program  She denies any further injury or trauma to her bilateral knees  She denies any distal paresthesias  Review of Systems  Pertinent items are noted in HPI  All other systems were reviewed and are negative  Physical Exam  Ht 5' 5" (1 651 m)   Wt 136 kg (299 lb)   BMI 49 76 kg/m²   Cons: Appears well  No apparent distress  Psych: Alert  Oriented x3  Mood and affect normal   Eyes: PERRLA, EOMI  Resp: Normal effort  No audible wheezing or stridor  CV: Palpable pulse  No discernable arrhythmia  Trace LE edema  Lymph:  No palpable cervical, axillary, or inguinal lymphadenopathy  Skin: Warm  No palpable masses  No visible lesions  Neuro: Normal muscle tone  Normal and symmetric DTR's  Bilateral Knee Exam  Alignment:  Normal knee alignment  Inspection:  No swelling  Palpation:  Moderate tenderness at Medial joint line of the right knee  Mild tenderness around the patella of the right knee  No tenderness of the left knee  ROM:  Knee Extension 0  Knee Flexion 115  Strength:  5/5 quadriceps and hamstrings  Able to actively extend knee against gravity  Stability:  No objective knee instability  Stable Varus / Valgus stress, Lachman, and Posterior drawer  Tests:  (-) Diego  Patella:  Patella tracks centrally with crepitus  Neurovascular:  Sensation intact in DP/SP/Duval/Sa/T nerve distributions  2+ DP & PT pulses  Gait:  Antalgic  Studies Reviewed  I personally reviewed x-rays of bilateral knee obtained on February 12, 2019 which demonstrated mild bilateral knee osteoarthritis    Large joint arthrocentesis: R knee  Universal Protocol:  Consent: Verbal consent obtained  Consent given by: patient    Supporting Documentation  Indications: pain   Procedure Details  Location: knee - R knee  Needle size: 22 G  Approach: lateral  Medications administered: 6 mL bupivacaine (PF) 0 5 %; 48 mg hylan 48 MG/6ML    Patient tolerance: patient tolerated the procedure well with no immediate complications  Dressing:  Sterile dressing applied             Medical, Surgical, Family, and Social History  The patient's medical history, family history, and social history, were reviewed and updated as appropriate      Past Medical History:   Diagnosis Date    Constipation     Hay fever     Pulmonary embolism ([address] )        Past Surgical History:   Procedure Laterality Date    HYSTERECTOMY  2013    MYOMECTOMY  1994    REDUCTION MAMMAPLASTY  2011       Family History   Problem Relation Age of Onset    Hypertension Mother    Chevy Alcon Rheum arthritis Mother     Prostate cancer Father     Hypertension Father     Heart attack Father     Glaucoma Father     Hypertension Maternal Grandmother     Stroke Maternal Grandmother     Heart attack Paternal Grandmother        Social History     Occupational History    Not on file   Tobacco Use    Smoking status: Never Smoker    Smokeless tobacco: Never Used   Vaping Use    Vaping Use: Never used   Substance and Sexual Activity    Alcohol use: Yes     Comment: socially    Drug use: No    Sexual activity: Not on file       Allergies   Allergen Reactions    Seasonal Ic  [Cholestatin]          Current Outpatient Medications:     ALPRAZolam (XANAX) 0 5 mg tablet, Take 1 tablet (0 5 mg total) by mouth daily at bedtime as needed for anxiety, Disp: 20 tablet, Rfl: 1    ergocalciferol (VITAMIN D2) 50,000 units, TAKE ONE CAPSULE BY MOUTH ONE TIME PER WEEK, Disp: 12 capsule, Rfl: 0    hydrochlorothiazide (HYDRODIURIL) 25 mg tablet, Take 25 mg by mouth, Disp: , Rfl:     montelukast (SINGULAIR) 10 mg tablet, TAKE 1 TABLET BY MOUTH EVERY DAY, Disp: 30 tablet, Rfl: 0    sertraline (ZOLOFT) 50 mg tablet, Take 50 mg by mouth daily, Disp: , Rfl:     SUMAtriptan (IMITREX) 100 mg tablet, Take 1 tablet (100 mg total) by mouth once as needed for migraine for up to 1 dose Pt requests BRAND NECCESSARY, Disp: 10 tablet, Rfl: 1      Inga Solorio PA-C    Scribe Attestation    I,:   am acting as a scribe while in the presence of the attending physician :       I,:   personally performed the services described in this documentation    as scribed in my presence :

## 2022-04-19 ENCOUNTER — TELEPHONE (OUTPATIENT)
Dept: OBGYN CLINIC | Facility: HOSPITAL | Age: 57
End: 2022-04-19

## 2022-04-19 NOTE — TELEPHONE ENCOUNTER
I spoke with the patient who it sounds like his having arthritic flare  She is not having any symptoms of infection at this time other than the swelling  I did review arthritic treatments for the knee that [facility] admission previously which she will continue to try at this time if she continues to have pain and would like to proceed with an aspiration and injection she will let us know Thursday so he can try to get her on the schedule with 1 of the Sports Med/urgent care people Friday before she goes out of town

## 2022-04-19 NOTE — TELEPHONE ENCOUNTER
Patient calling in to discuss her right knee  She thinks something may have gone wrong with the injection she received  Patient would like a call at [phone]   Thank you

## 2022-04-19 NOTE — TELEPHONE ENCOUNTER
I spoke with patient and she states the pain and swelling started a few days ago, thought it may be the weather  It is swollen and painful, warm to touch on opposite side of the knee from injection site  No fever, no redness  Does not hurt to bear weight but to extend the leg  She has pain level all the time 8/10  She is just taking tylenol OTC  Advised she can take NSAIDS and has Naproxen in the house  Advised she can take this with food, RICE method  She is 5 weeks SP Visco injection  Please advise

## 2022-10-07 ENCOUNTER — OFFICE VISIT (OUTPATIENT)
Dept: URGENT CARE | Facility: CLINIC | Age: 57
End: 2022-10-07
Payer: COMMERCIAL

## 2022-10-07 VITALS
RESPIRATION RATE: 18 BRPM | TEMPERATURE: 96.7 F | SYSTOLIC BLOOD PRESSURE: 138 MMHG | DIASTOLIC BLOOD PRESSURE: 80 MMHG | OXYGEN SATURATION: 96 % | HEART RATE: 67 BPM

## 2022-10-07 DIAGNOSIS — H60.501 ACUTE OTITIS EXTERNA OF RIGHT EAR, UNSPECIFIED TYPE: Primary | ICD-10-CM

## 2022-10-07 PROCEDURE — S9083 URGENT CARE CENTER GLOBAL: HCPCS | Performed by: PHYSICIAN ASSISTANT

## 2022-10-07 PROCEDURE — G0381 LEV 2 HOSP TYPE B ED VISIT: HCPCS | Performed by: PHYSICIAN ASSISTANT

## 2022-10-07 RX ORDER — FLUOXETINE HYDROCHLORIDE 20 MG/1
20 CAPSULE ORAL DAILY
COMMUNITY
Start: 2022-07-11

## 2022-10-07 RX ORDER — NEOMYCIN SULFATE, POLYMYXIN B SULFATE AND HYDROCORTISONE 10; 3.5; 1 MG/ML; MG/ML; [USP'U]/ML
SUSPENSION/ DROPS AURICULAR (OTIC)
Qty: 10 ML | Refills: 0 | Status: SHIPPED | OUTPATIENT
Start: 2022-10-07

## 2022-10-07 NOTE — PROGRESS NOTES
[facility]    NAME: Adam Cassidy is a 56 y o  female  : 1965    MRN: [id number]  DATE: 2022  TIME: 2:01 PM    Assessment and Plan   Acute otitis externa of right ear, unspecified type [H60 501]  1  Acute otitis externa of right ear, unspecified type  neomycin-polymyxin-hydrocortisone (CORTISPORIN) 0 35%-10,000 units/mL-1% otic suspension       Patient Instructions   Patient Instructions   Use ear drops as instructed  Avoid putting Q-tips in ears  Follow-up with primary care provider if not improving over the next 3-5 days  Chief Complaint     Chief Complaint   Patient presents with    Ear Injury     Pt C/O ear drainage from right ear  A weeks ago thought possibly a pimple, used a Q-Tip to pop it  A couple of days ago, felt discomfort and noted swelling and small amount of blood on a Q-Tip       History of Present Illness   Adam Cassidy presents to the clinic c/o  72-year-old female comes in with pain and decreased hearing right ear that started this last week  Felt like there was something in it a couple weeks ago in used a Q-tip  Thought there was a pimple and squeezed on it  Now area is more uncomfortable  Wonders if she got a bug in her ear  She has been using alcohol on a Q-tip and does get a little bit of bloody drainage out  No fever or chills  Review of Systems   Review of Systems   Constitutional: Negative  HENT: Positive for ear discharge, ear pain and hearing loss  Negative for congestion          Current Medications     Long-Term Medications   Medication Sig Dispense Refill    ALPRAZolam (XANAX) 0 5 mg tablet Take 1 tablet (0 5 mg total) by mouth daily at bedtime as needed for anxiety 20 tablet 1    ergocalciferol (VITAMIN D2) 50,000 units TAKE ONE CAPSULE BY MOUTH ONE TIME PER WEEK 12 capsule 0    FLUoxetine (PROzac) 20 mg capsule Take 20 mg by mouth daily      hydrochlorothiazide (HYDRODIURIL) 25 mg tablet Take 25 mg by mouth      montelukast (SINGULAIR) 10 mg tablet TAKE 1 TABLET BY MOUTH EVERY DAY 30 tablet 0    neomycin-polymyxin-hydrocortisone (CORTISPORIN) 0 35%-10,000 units/mL-1% otic suspension 3 ggts in ear(s) qid x 7 days 10 mL 0    SUMAtriptan (IMITREX) 100 mg tablet Take 1 tablet (100 mg total) by mouth once as needed for migraine for up to 1 dose Pt requests BRAND NECCESSARY (Patient not taking: Reported on 10/7/2022) 10 tablet 1       Current Allergies     Allergies as of 10/07/2022 - Reviewed 10/07/2022   Allergen Reaction Noted    Seasonal ic  [cholestatin]  04/02/2019          The following portions of the patient's history were reviewed and updated as appropriate: allergies, current medications, past family history, past medical history, past social history, past surgical history and problem list   Past Medical History:   Diagnosis Date    Constipation     Hay fever     Pulmonary embolism ([address] )      Past Surgical History:   Procedure Laterality Date    HYSTERECTOMY  2013    MYOMECTOMY  1994    REDUCTION MAMMAPLASTY  2011     Family History   Problem Relation Age of Onset    Hypertension Mother    Baylis Mark Rheum arthritis Mother     Prostate cancer Father     Hypertension Father     Heart attack Father     Glaucoma Father     Hypertension Maternal Grandmother     Stroke Maternal Grandmother     Heart attack Paternal Grandmother        Objective   /80   Pulse 67   Temp (!) 96 7 °F (35 9 °C)   Resp 18   SpO2 96%   No LMP recorded  Physical Exam     Physical Exam  Vitals and nursing note reviewed  Constitutional:       General: She is not in acute distress  Appearance: Normal appearance  She is well-developed  She is not ill-appearing, toxic-appearing or diaphoretic  HENT:      Head: Normocephalic and atraumatic  Right Ear: Tympanic membrane normal  There is no impacted cerumen  Left Ear: Tympanic membrane, ear canal and external ear normal  There is no impacted cerumen        Ears:      Comments: TTP right tragus and helix  Swelling noted with increased redness of the right EAC  Pimple appears just in to the canal   No obvious bloody drainage or discharge at this time  TM intact  Eyes:      General: No scleral icterus  Right eye: No discharge  Left eye: No discharge  Extraocular Movements: Extraocular movements intact  Conjunctiva/sclera: Conjunctivae normal       Pupils: Pupils are equal, round, and reactive to light  Cardiovascular:      Rate and Rhythm: Normal rate and regular rhythm  Pulmonary:      Effort: Pulmonary effort is normal    Musculoskeletal:      Cervical back: Normal range of motion and neck supple  No rigidity or tenderness  No muscular tenderness  Lymphadenopathy:      Cervical: No cervical adenopathy  Skin:     General: Skin is warm and dry  Findings: No erythema or rash  Neurological:      Mental Status: She is alert and oriented to person, place, and time     Psychiatric:         Mood and Affect: Mood normal          Behavior: Behavior normal

## 2022-10-07 NOTE — PATIENT INSTRUCTIONS
Use ear drops as instructed  Avoid putting Q-tips in ears  Follow-up with primary care provider if not improving over the next 3-5 days

## 2022-11-03 ENCOUNTER — CONSULT (OUTPATIENT)
Dept: GASTROENTEROLOGY | Facility: MEDICAL CENTER | Age: 57
End: 2022-11-03

## 2022-11-03 VITALS
HEART RATE: 80 BPM | TEMPERATURE: 98.7 F | SYSTOLIC BLOOD PRESSURE: 127 MMHG | WEIGHT: 279.8 LBS | BODY MASS INDEX: 46.56 KG/M2 | DIASTOLIC BLOOD PRESSURE: 78 MMHG

## 2022-11-03 DIAGNOSIS — K81.0 ACUTE CHOLECYSTITIS: ICD-10-CM

## 2022-11-03 DIAGNOSIS — R11.14 BILIOUS VOMITING WITH NAUSEA: Primary | ICD-10-CM

## 2022-11-03 NOTE — PROGRESS NOTES
[facility] - Outpatient Consultation  Kimberly Thomson 56 y o  female MRN: [id number]  Encounter: [id number]          ASSESSMENT AND PLAN:      1  Bilious vomiting with nausea    2  Acute cholecystitis  Patient has acute cholecystitis but left LVH AMA  HIDA scan to evaluate for cholecystitis  [place] diet  Patient is aware to return to ED if she has recurrent N/V , abdominal pain, fever or chills  Refer to Dr Leida Denson    - NM hepatobiliary w rx; Future  - Ambulatory Referral to General Surgery; Future    ______________________________________________________________________    HPI:    53-year-old female with history of PE after uterus surgery now off anticoagulation presented for evaluation  Patient went to the [facility] emergency room for acute onset of nausea or vomiting  She was found to have elevated bilirubin at 1 3 and a slightly elevated ALT  Abdominal ultrasound showed thickened gallbladder wall and signs of acute cholecystitis  She was offered to have surgery but she could not comply as she has family responsibilities to fulfill  The presented today for follow-up  She was prescribed was Flagyl and levofloxacin when she was discharged from the hospital and she has not completed medication  REVIEW OF SYSTEMS:    CONSTITUTIONAL: Denies any fever, chills, rigors, and weight loss  HEENT: No earache or tinnitus  Denies hearing loss or visual disturbances  CARDIOVASCULAR: No chest pain or palpitations  RESPIRATORY: Denies any cough, hemoptysis, shortness of breath or dyspnea on exertion  GASTROINTESTINAL: As noted in the History of Present Illness  GENITOURINARY: No problems with urination  Denies any hematuria or dysuria  NEUROLOGIC: No dizziness or vertigo, denies headaches  MUSCULOSKELETAL: Denies any muscle or joint pain  SKIN: Denies skin rashes or itching  ENDOCRINE: Denies excessive thirst  Denies intolerance to heat or cold    PSYCHOSOCIAL: Denies depression or anxiety  Denies any recent memory loss  Historical Information   Past Medical History:   Diagnosis Date   • Constipation    • Hay fever    • Pulmonary embolism (HCC)      Past Surgical History:   Procedure Laterality Date   • HYSTERECTOMY  2013   • MYOMECTOMY  1994   • REDUCTION MAMMAPLASTY  2011     Social History   Social History     Substance and Sexual Activity   Alcohol Use Yes    Comment: socially     Social History     Substance and Sexual Activity   Drug Use No     Social History     Tobacco Use   Smoking Status Never Smoker   Smokeless Tobacco Never Used     Family History   Problem Relation Age of Onset   • Hypertension Mother    • Rheum arthritis Mother    • Prostate cancer Father    • Hypertension Father    • Heart attack Father    • Glaucoma Father    • Hypertension Maternal Grandmother    • Stroke Maternal Grandmother    • Heart attack Paternal Grandmother        Meds/Allergies       Current Outpatient Medications:   •  ALPRAZolam (XANAX) 0 5 mg tablet  •  ergocalciferol (VITAMIN D2) 50,000 units  •  FLUoxetine (PROzac) 20 mg capsule  •  hydrochlorothiazide (HYDRODIURIL) 25 mg tablet  •  montelukast (SINGULAIR) 10 mg tablet  •  neomycin-polymyxin-hydrocortisone (CORTISPORIN) 0 35%-10,000 units/mL-1% otic suspension  •  SUMAtriptan (IMITREX) 100 mg tablet    Allergies   Allergen Reactions   • Seasonal Ic  [Cholestatin]            Objective     Blood pressure 127/78, pulse 80, temperature 98 7 °F (37 1 °C), weight 127 kg (279 lb 12 8 oz)  Body mass index is 46 56 kg/m²  PHYSICAL EXAM:      General Appearance:   Alert, cooperative, no distress   HEENT:   Normocephalic, atraumatic, anicteric      Neck:  Supple, symmetrical, trachea midline   Lungs:   Clear to auscultation bilaterally; no rales, rhonchi or wheezing; respirations unlabored    Heart[de-identified]   Regular rate and rhythm; no murmur, rub, or gallop     Abdomen:   Soft, non-tender, non-distended; normal bowel sounds; no masses, no organomegaly    Genitalia:   Deferred    Rectal:   Deferred    Extremities:  No cyanosis, clubbing or edema    Pulses:  2+ and symmetric    Skin:  No jaundice, rashes, or lesions    Lymph nodes:  No palpable cervical lymphadenopathy        Lab Results:   No visits with results within 1 Day(s) from this visit  Latest known visit with results is:   Appointment on 02/16/2019   Component Date Value   • CRP 02/16/2019 27 2 (A)   • Sed Rate 02/16/2019 79 (A)   • Hemoglobin A1C 02/16/2019 5 1    • EAG 02/16/2019 100    • Vit D, 25-Hydroxy 02/16/2019 26 8 (A)   • Miscellaneous Lab Test R* 02/16/2019 SEE WRITTEN REPRT FROM [facility]          Radiology Results:   No results found

## 2022-11-04 ENCOUNTER — TELEPHONE (OUTPATIENT)
Dept: GASTROENTEROLOGY | Facility: CLINIC | Age: 57
End: 2022-11-04

## 2022-11-04 NOTE — TELEPHONE ENCOUNTER
Patients GI provider:  Dr Nestor Solomon     Number to return call: [phone]    Reason for call: Pt calling stating she was given a referral at her appt for General surgery but there is no name or phone number on the referral  Pt would like a call back       Scheduled procedure/appointment date if applicable: n/a

## 2022-11-21 ENCOUNTER — HOSPITAL ENCOUNTER (OUTPATIENT)
Dept: RADIOLOGY | Facility: HOSPITAL | Age: 57
Discharge: HOME | End: 2022-11-21
Attending: INTERNAL MEDICINE

## 2022-11-21 DIAGNOSIS — K81.0 ACUTE CHOLECYSTITIS: ICD-10-CM

## 2022-11-21 DIAGNOSIS — R11.14 BILIOUS VOMITING WITH NAUSEA: ICD-10-CM

## 2022-11-21 RX ORDER — WATER 1000 ML/1000ML
INJECTION, SOLUTION INTRAVENOUS
Status: COMPLETED
Start: 2022-11-21 | End: 2022-11-21

## 2022-11-21 RX ADMIN — WATER 5 ML: 1 INJECTION INTRAMUSCULAR; INTRAVENOUS; SUBCUTANEOUS at 10:40

## 2022-11-21 RX ADMIN — SINCALIDE 2.5 MCG: 5 INJECTION, POWDER, LYOPHILIZED, FOR SOLUTION INTRAVENOUS at 11:18

## 2022-11-23 ENCOUNTER — CONSULT (OUTPATIENT)
Dept: SURGERY | Facility: CLINIC | Age: 57
End: 2022-11-23

## 2022-11-23 VITALS — BODY MASS INDEX: 45.35 KG/M2 | WEIGHT: 272.2 LBS | HEIGHT: 65 IN | TEMPERATURE: 97.1 F | RESPIRATION RATE: 16 BRPM

## 2022-11-23 DIAGNOSIS — K81.0 ACUTE CHOLECYSTITIS: ICD-10-CM

## 2022-11-23 DIAGNOSIS — K80.10 CCC (CHRONIC CALCULOUS CHOLECYSTITIS): Primary | ICD-10-CM

## 2022-11-23 DIAGNOSIS — E66.01 MORBID OBESITY (HCC): ICD-10-CM

## 2022-11-23 NOTE — ASSESSMENT & PLAN NOTE
Reviewed her imaging and her emergency department visit  She does have evidence of cholelithiasis and some wall thickening at that time  It may have been acute cholecystitis  At that time but she refused surgery at that time  Since then she has been doing better but her symptoms are not classic biliary colic type symptoms  There may be some overlapping of reflux disease which she has history of  However with her ultrasound findings of stones and wall thickening I do recommend consideration for cholecystectomy in the future  I would recommend waiting around 6-8 weeks after her visit the emergency department prior to proceeding with surgery  At this point she is uncertain if she would like to proceed with surgery and she would like to think about it  All for follow-up in 6 weeks in the new year to see how she is doing  I told her to keep track of her symptoms to see if we can correlate them more definitively  In addition she has severe attacks over the holiday she should call for re-evaluation sooner

## 2022-11-23 NOTE — PROGRESS NOTES
Assessment/Plan:    CCC (chronic calculous cholecystitis)    Reviewed her imaging and her emergency department visit  She does have evidence of cholelithiasis and some wall thickening at that time  It may have been acute cholecystitis  At that time but she refused surgery at that time  Since then she has been doing better but her symptoms are not classic biliary colic type symptoms  There may be some overlapping of reflux disease which she has history of  However with her ultrasound findings of stones and wall thickening I do recommend consideration for cholecystectomy in the future  I would recommend waiting around 6-8 weeks after her visit the emergency department prior to proceeding with surgery  At this point she is uncertain if she would like to proceed with surgery and she would like to think about it  All for follow-up in 6 weeks in the new year to see how she is doing  I told her to keep track of her symptoms to see if we can correlate them more definitively  In addition she has severe attacks over the holiday she should call for re-evaluation sooner  Morbid obesity ([address] )    Recommend weight loss and exercise  Diagnoses and all orders for this visit:    Jose Mcbride (chronic calculous cholecystitis)    Acute cholecystitis  -     Ambulatory Referral to General Surgery    Morbid obesity ([address] )          Subjective:      Patient ID: Tea Valdez is a 56 y o  female  Ms Lexi Ford   Is a 51-year-old female here for evaluation of cholecystitis  She had an episode of epigastric abdominal pain that prompted evaluation in the emergency department  Her pain was more epigastric and possibly to her back she was very concerned because history of a pulmonary embolism after hysterectomy and thought might be this  Emergency department she had a right upper quadrant ultrasound that showed evidence for possible acute cholecystitis    She is recommended to have cholecystectomy at that time but as she states she was feeling better and she had to take care of her brother she decided not to stay for her surgery  Since then she states she is feeling better  She has a couple episodes of she describes epigastric pain spoke that wakes up male in I that has some nausea vomiting associated with it  She does denies specific right upper quadrant abdominal pain  She does report another episode a couple years ago where she was seen emergency department for similar complaints  The following portions of the patient's history were reviewed and updated as appropriate: allergies, current medications, past family history, past medical history, past social history, past surgical history and problem list     Review of Systems   Constitutional: Negative for chills and fever  HENT: Negative for ear pain and sore throat  Eyes: Negative for pain and visual disturbance  Respiratory: Negative for cough and shortness of breath  Cardiovascular: Negative for chest pain and palpitations  Gastrointestinal: Positive for abdominal pain and vomiting  Genitourinary: Negative for dysuria and hematuria  Musculoskeletal: Negative for arthralgias and back pain  Skin: Negative for color change and rash  Neurological: Negative for seizures and syncope  Psychiatric/Behavioral: Negative for agitation and behavioral problems  All other systems reviewed and are negative  Objective:      Temp (!) 97 1 °F (36 2 °C)   Resp 16   Ht 5' 5" (1 651 m)   Wt 123 kg (272 lb 3 2 oz)   BMI 45 30 kg/m²          Physical Exam  Vitals and nursing note reviewed  Constitutional:       General: She is active  She is not in acute distress  Appearance: She is well-developed and well-nourished  She is not diaphoretic  HENT:      Head: Normocephalic and atraumatic  Eyes:      Pupils: Pupils are equal, round, and reactive to light  Cardiovascular:      Rate and Rhythm: Normal rate and regular rhythm  Heart sounds: Normal heart sounds  No murmur heard  Pulmonary:      Effort: Pulmonary effort is normal  No respiratory distress  Breath sounds: Normal breath sounds  No wheezing  Abdominal:      General: Bowel sounds are normal       Palpations: Abdomen is soft  Tenderness: There is no abdominal tenderness  Musculoskeletal:         General: Normal range of motion  Cervical back: Normal range of motion and neck supple  Skin:     General: Skin is warm and dry  Neurological:      Mental Status: She is alert and oriented to person, place, and time     Psychiatric:         Mood and Affect: Mood and affect normal          Behavior: Behavior normal

## 2023-01-10 ENCOUNTER — OFFICE VISIT (OUTPATIENT)
Dept: SURGERY | Facility: CLINIC | Age: 58
End: 2023-01-10

## 2023-01-10 VITALS
TEMPERATURE: 97.1 F | RESPIRATION RATE: 16 BRPM | BODY MASS INDEX: 44.98 KG/M2 | SYSTOLIC BLOOD PRESSURE: 132 MMHG | WEIGHT: 270 LBS | HEART RATE: 69 BPM | DIASTOLIC BLOOD PRESSURE: 84 MMHG | HEIGHT: 65 IN

## 2023-01-10 DIAGNOSIS — E66.01 MORBID OBESITY (HCC): Primary | ICD-10-CM

## 2023-01-10 DIAGNOSIS — K21.00 GASTROESOPHAGEAL REFLUX DISEASE WITH ESOPHAGITIS WITHOUT HEMORRHAGE: ICD-10-CM

## 2023-01-10 PROBLEM — K80.20 CALCULUS OF GALLBLADDER WITHOUT CHOLECYSTITIS WITHOUT OBSTRUCTION: Status: ACTIVE | Noted: 2023-01-10

## 2023-01-10 NOTE — PROGRESS NOTES
Assessment/Plan:    Calculus of gallbladder without cholecystitis without obstruction  She is been doing well for the last 6 weeks and has had no symptoms  I reviewed with her again that she does have gallstones and a mildly reduced ejection fraction  As she is asymptomatic there is no urgency to proceed with cholecystectomy  She is planning to lose some weight and was question if she had any restrictions of which she does not  I told her starts having mild twinges she should contact me and consideration for cholecystectomy without any active inflammation  Otherwise if she has severe pain she may need to go to the ER  She can follow-up with me as needed to discuss questions or for plan at any time  Reflux esophagitis  In addition her reflux symptoms have resolved as well  Considering well off medication  She begins having reflux symptoms and she will return for evaluation with gastroenterology       Diagnoses and all orders for this visit:    Morbid obesity ([address] )    Gastroesophageal reflux disease with esophagitis without hemorrhage          Subjective:      Patient ID: Mariana Germain is a 56 y o  female  Ms Rj Viera   Is a 44-year-old female here for evaluation of cholecystitis  She had an episode of epigastric abdominal pain that prompted evaluation in the emergency department  Her pain was more epigastric and possibly to her back she was very concerned because history of a pulmonary embolism after hysterectomy and thought might be this  Emergency department she had a right upper quadrant ultrasound that showed evidence for possible acute cholecystitis  She is recommended to have cholecystectomy at that time but as she states she was feeling better and she had to take care of her brother she decided not to stay for her surgery  Since then she states she is feeling better    She has a couple episodes of she describes epigastric pain spoke that wakes up male in I that has some nausea vomiting associated with it  She does denies specific right upper quadrant abdominal pain  She does report another episode a couple years ago where she was seen emergency department for similar complaints  1/10/2023  She returns now for long-term follow-up  Since being seen she states she has minimal to no symptoms  She is actually pushing her limits to see if she could recreate some of her symptoms and she did not  She has been eating high-fat foods and other foods without difficulty  Very occasionally has some mild nausea when waking up in [place] to the bathroom but very mild  Otherwise is doing well      The following portions of the patient's history were reviewed and updated as appropriate: allergies, current medications, past family history, past medical history, past social history, past surgical history and problem list     Review of Systems   Constitutional: Negative for chills and fever  HENT: Negative for ear pain and sore throat  Eyes: Negative for pain and visual disturbance  Respiratory: Negative for cough and shortness of breath  Cardiovascular: Negative for chest pain and palpitations  Gastrointestinal: Positive for abdominal pain and vomiting  Genitourinary: Negative for dysuria and hematuria  Musculoskeletal: Negative for arthralgias and back pain  Skin: Negative for color change and rash  Neurological: Negative for seizures and syncope  Psychiatric/Behavioral: Negative for agitation and behavioral problems  All other systems reviewed and are negative  Objective:      /84   Pulse 69   Temp (!) 97 1 °F (36 2 °C)   Resp 16   Ht 5' 5" (1 651 m)   Wt 122 kg (270 lb)   BMI 44 93 kg/m²          Physical Exam  Vitals and nursing note reviewed  Constitutional:       General: She is not in acute distress  Appearance: She is well-developed  She is not diaphoretic  HENT:      Head: Normocephalic and atraumatic     Eyes:      Pupils: Pupils are equal, round, and reactive to light  Cardiovascular:      Rate and Rhythm: Normal rate and regular rhythm  Heart sounds: Normal heart sounds  No murmur heard  Pulmonary:      Effort: Pulmonary effort is normal  No respiratory distress  Breath sounds: Normal breath sounds  No wheezing  Abdominal:      General: Bowel sounds are normal       Palpations: Abdomen is soft  Tenderness: There is no abdominal tenderness  Musculoskeletal:         General: Normal range of motion  Cervical back: Normal range of motion and neck supple  Skin:     General: Skin is warm and dry  Neurological:      Mental Status: She is alert and oriented to person, place, and time     Psychiatric:         Behavior: Behavior normal  pulse oximetry

## 2023-01-10 NOTE — ASSESSMENT & PLAN NOTE
In addition her reflux symptoms have resolved as well  Considering well off medication    She begins having reflux symptoms and she will return for evaluation with gastroenterology

## 2023-01-10 NOTE — ASSESSMENT & PLAN NOTE
She is been doing well for the last 6 weeks and has had no symptoms  I reviewed with her again that she does have gallstones and a mildly reduced ejection fraction  As she is asymptomatic there is no urgency to proceed with cholecystectomy  She is planning to lose some weight and was question if she had any restrictions of which she does not  I told her starts having mild twinges she should contact me and consideration for cholecystectomy without any active inflammation  Otherwise if she has severe pain she may need to go to the ER  She can follow-up with me as needed to discuss questions or for plan at any time

## 2023-08-29 ENCOUNTER — EVALUATION (OUTPATIENT)
Dept: PHYSICAL THERAPY | Facility: CLINIC | Age: 58
End: 2023-08-29
Payer: COMMERCIAL

## 2023-08-29 DIAGNOSIS — G89.29 CHRONIC BILATERAL LOW BACK PAIN WITHOUT SCIATICA: Primary | ICD-10-CM

## 2023-08-29 DIAGNOSIS — M54.50 CHRONIC BILATERAL LOW BACK PAIN WITHOUT SCIATICA: Primary | ICD-10-CM

## 2023-08-29 PROCEDURE — 97161 PT EVAL LOW COMPLEX 20 MIN: CPT | Performed by: PHYSICAL THERAPIST

## 2023-08-29 PROCEDURE — 97110 THERAPEUTIC EXERCISES: CPT | Performed by: PHYSICAL THERAPIST

## 2023-08-29 NOTE — LETTER
2023    Cherl Kussmaul, MD  [address]    Patient: Regi Ruff   YOB: 1965   Date of Visit: 2023     Encounter Diagnosis     ICD-10-CM    1. Chronic bilateral low back pain without sciatica  M54.50     G89.29           Dear Dr. Devi Care: Thank you for your recent referral of Regi Ruff. Please review the attached evaluation summary from [place]'s recent visit. Please verify that you agree with the plan of care by signing the attached order. If you have any questions or concerns, please do not hesitate to call. I sincerely appreciate the opportunity to share in the care of one of your patients and hope to have another opportunity to work with you in the near future. Sincerely,    Mookie Benito, PT      Referring Provider:      I certify that I have read the below Plan of Care and certify the need for these services furnished under this plan of treatment while under my care. Cherl Kussmaul, MD  [address]  Via Fax: [phone]          PT Evaluation     Today's date: 2023  Patient name: Regi Ruff  : 1965  MRN: [id number]  Referring provider: Cherl Kussmaul, MD  Dx:   Encounter Diagnosis     ICD-10-CM    1. Chronic bilateral low back pain without sciatica  M54.50     G89.29                      Assessment  Assessment details: Patient is a 56 y.o. female who  presents with pain, range of motion loss, weakness associated with a diagnosis of lumbar DJD. Has functional limitations as a result of impairments. Patient would benefit from course of skilled physical therapy to address above listed impairments in an effort to improve function. Discussed that exercise including cardiovascular exercise, core and LE strengthening and weight loss are her best long term solutions to her back pain.   Understanding of Dx/Px/POC: excellent  Goals  Short Term Goals:  1) Pain : Decrease low back pain to 2/10 at worst x 1 continuous week within 2-3 weeks. 2) ROM: Improve lumbar  ROM by at least 25% for all noted as limited up to full within 2-3 weeks. 3) Strength:  Improved score on deep core muscle contraction scale by at least   2 points within 2-3 weeks. 4) Flexibility: Improved 90-90 Hamstring flexibility by at least 6 degrees within 2-3 weeks. 5) Function: Improved FOTO score from IE within 2-3 weeks (36 @ IE),  patient to note greater ease with activities of daily living within 2-3 weeks. LongTerm Goals:  1) Pain : Eliminate low back pain x 1 continuous week within 4-6 weeks. 2) ROM: Improve lumbar ROM to full within 4-6 weeks. 3) Strength: Improved LE strength to 5/5 for all noted as weak within 4-6 weeks, Improved score on deep core muscle contraction scale by at least 4 points within 4-6 weeks. 4) Function: Improved FOTO score to at least 50 within 6 weeks  5) Independent with home exercise program within 6 weeks. Plan  Patient would benefit from: skilled PT  Planned modality interventions: biofeedback, cryotherapy, TENS, thermotherapy: hydrocollator packs and ultrasound  Planned therapy interventions: abdominal trunk stabilization, body mechanics training, breathing training, flexibility, functional ROM exercises, gait training, home exercise program, joint mobilization, manual therapy, neuromuscular re-education, patient education, self care, strengthening, stretching, therapeutic activities, therapeutic exercise and therapeutic training  Frequency: 2x's per week x 4-6 weeks. Treatment plan discussed with: patient        Subjective Evaluation    History of Present Illness  Mechanism of injury: Patient is a 56 y.o. old female who presents for an initial outpatient physical therapy consultation regarding her low back pain. Has had chronic low back pain,  treated previously with steroidal injections for at least the past five years.     Most recent injection earlier this year (March)  helped with R sided sciatic symptoms but still has unresolved low back pain. Having sleep interruption associated with pain. Getting in and out of bed painful difficult. Stair ambulation difficult. Prolonged periods standing and walking contribute to pain, if she uses cart while grocery shopping this is helpful. Lumbar X-ray 5/14/23 :  Prominent lower lumbar spine facet arthropathy and mild degenerative disc isease L4-5 and L5-S1. Lumbar MRI 2/27/23 :  Small right extraforaminal disc protrusion at L4-5,  small right foraminal disc protrusion at L5-S1, Mild degenerative anterolisthesis of L4. Patient Goals  Patient goals for therapy: decreased pain, increased motion and increased strength    Pain  At worst pain rating: 10  Location: R > L low back, buttocks  Alleviating factors: inversion table. ice. Social Support  Lives in: multiple-level home    Employment status: working (Desk work (works two jobs))  Exercise history: no regular exercise          Objective     General Comments:      Lumbar Comments  Lumbar AROM:  Flexion: 100% R sided low back pain. Extension:75%, R side low back pain. R SB:75%  L SB: 75%, has R sided low back and and pain extending into buttock on way to L SB  R ROT: 75%, R posterior thigh pain, pain in area of sacrum  L ROT: 75% L  posterior thigh pain, pain in area of sacrum      Repeated Testing:  Flexion: Increased R low back pain  Extension : Increased R posterior thigh pain. LE Myotomes:5/5 (B)      Special Testing:  SLUMP (-) (B)    LE Flexibility  90-90 Hamstrings:  R: 30 degrees  L: 30 degrees        Palpation:Tender to palpation with C PA glides L4-L5, (R) sided U PA glides L3-L5 with hypomobility at same levels.       Core strength:  Deep Core Muscle Contraction Scale Score: 6/10  Deep Muscle Contraction Scale :   Quality of contraction:2  No contraction : 0  Rapid, superficial contraction: 1  Just perceptible contraction: 2  Gentle, slow contraction: 3    Substitution:1  Resting substitution:0  Moderate to strong substitution:1  Subtle perceptible substitution:2  No substitution:3      Symmetry:2  Unilateral contraction:0  Bilateral but asymmetrical contraction:1  Symmetrical contraction:2     Breathing:   Inability or difficulty with breathing during contraction: 0  Able to hold contraction while maintaining breathin    Holdin  Holding < 10 seconds:  0  Holding > 10 seconds: 1      Gait: WNL            Precautions: HTN, history of PE, use flexion bias for exercise for now    Manuals 23            Manual (B) HS stretch NV                                                   Neuro Re-Ed             PPT with ball sq.  NV            PPT with hooklying ABD NV            PPT with march NV            TA with clamshell NV            TA with (B) Tband  shoulder extension NV                                      Ther Ex             Treadmill ambulation for cardiovascular training NV            Seated Pball flexion stretch (L, Center, R) NV                                                                                          Ther Activity                                       Gait Training                                       Modalities             ESTIM prn             MH/Cryo prn             IE/HEP RG

## 2023-08-29 NOTE — PROGRESS NOTES
PT Evaluation     Today's date: 2023  Patient name: Diana Masters  : 1965  MRN: [id number]  Referring provider: Doug Soto MD  Dx:   Encounter Diagnosis     ICD-10-CM    1. Chronic bilateral low back pain without sciatica  M54.50     G89.29                      Assessment  Assessment details: Patient is a 56 y.o. female who  presents with pain, range of motion loss, weakness associated with a diagnosis of lumbar DJD. Has functional limitations as a result of impairments. Patient would benefit from course of skilled physical therapy to address above listed impairments in an effort to improve function. Discussed that exercise including cardiovascular exercise, core and LE strengthening and weight loss are her best long term solutions to her back pain. Understanding of Dx/Px/POC: excellent  Goals  Short Term Goals:  1) Pain : Decrease low back pain to 2/10 at worst x 1 continuous week within 2-3 weeks. 2) ROM: Improve lumbar  ROM by at least 25% for all noted as limited up to full within 2-3 weeks. 3) Strength:  Improved score on deep core muscle contraction scale by at least   2 points within 2-3 weeks. 4) Flexibility: Improved 90-90 Hamstring flexibility by at least 6 degrees within 2-3 weeks. 5) Function: Improved FOTO score from IE within 2-3 weeks (36 @ IE),  patient to note greater ease with activities of daily living within 2-3 weeks. LongTerm Goals:  1) Pain : Eliminate low back pain x 1 continuous week within 4-6 weeks. 2) ROM: Improve lumbar ROM to full within 4-6 weeks. 3) Strength: Improved LE strength to 5/5 for all noted as weak within 4-6 weeks, Improved score on deep core muscle contraction scale by at least 4 points within 4-6 weeks. 4) Function: Improved FOTO score to at least 50 within 6 weeks  5) Independent with home exercise program within 6 weeks.       Plan  Patient would benefit from: skilled PT  Planned modality interventions: biofeedback, cryotherapy, TENS, thermotherapy: hydrocollator packs and ultrasound  Planned therapy interventions: abdominal trunk stabilization, body mechanics training, breathing training, flexibility, functional ROM exercises, gait training, home exercise program, joint mobilization, manual therapy, neuromuscular re-education, patient education, self care, strengthening, stretching, therapeutic activities, therapeutic exercise and therapeutic training  Frequency: 2x's per week x 4-6 weeks. Treatment plan discussed with: patient        Subjective Evaluation    History of Present Illness  Mechanism of injury: Patient is a 56 y.o. old female who presents for an initial outpatient physical therapy consultation regarding her low back pain. Has had chronic low back pain,  treated previously with steroidal injections for at least the past five years. Most recent injection earlier this year (March)  helped with R sided sciatic symptoms but still has unresolved low back pain. Having sleep interruption associated with pain. Getting in and out of bed painful difficult. Stair ambulation difficult. Prolonged periods standing and walking contribute to pain, if she uses cart while grocery shopping this is helpful. Lumbar X-ray 5/14/23 :  Prominent lower lumbar spine facet arthropathy and mild degenerative disc isease L4-5 and L5-S1. Lumbar MRI 2/27/23 :  Small right extraforaminal disc protrusion at L4-5,  small right foraminal disc protrusion at L5-S1, Mild degenerative anterolisthesis of L4. Patient Goals  Patient goals for therapy: decreased pain, increased motion and increased strength    Pain  At worst pain rating: 10  Location: R > L low back, buttocks  Alleviating factors: inversion table. ice.     Social Support  Lives in: multiple-level home    Employment status: working (Desk work (works two jobs))  Exercise history: no regular exercise          Objective     General Comments:      Lumbar Comments  Lumbar AROM:  Flexion: 100% R sided low back pain. Extension:75%, R side low back pain. R SB:75%  L SB: 75%, has R sided low back and and pain extending into buttock on way to L SB  R ROT: 75%, R posterior thigh pain, pain in area of sacrum  L ROT: 75% L  posterior thigh pain, pain in area of sacrum      Repeated Testing:  Flexion: Increased R low back pain  Extension : Increased R posterior thigh pain. LE Myotomes:5/5 (B)      Special Testing:  SLUMP (-) (B)    LE Flexibility  90-90 Hamstrings:  R: 30 degrees  L: 30 degrees        Palpation:Tender to palpation with C PA glides L4-L5, (R) sided U PA glides L3-L5 with hypomobility at same levels. Core strength:  Deep Core Muscle Contraction Scale Score: 6/10  Deep Muscle Contraction Scale :   Quality of contraction:2  No contraction : 0  Rapid, superficial contraction: 1  Just perceptible contraction: 2  Gentle, slow contraction: 3    Substitution:1  Resting substitution:0  Moderate to strong substitution:1  Subtle perceptible substitution:2  No substitution:3      Symmetry:2  Unilateral contraction:0  Bilateral but asymmetrical contraction:1  Symmetrical contraction:2     Breathing:   Inability or difficulty with breathing during contraction: 0  Able to hold contraction while maintaining breathin    Holdin  Holding < 10 seconds:  0  Holding > 10 seconds: 1      Gait: WNL             Precautions: HTN, history of PE, use flexion bias for exercise for now    Manuals 23            Manual (B) HS stretch NV                                                   Neuro Re-Ed             PPT with ball sq.  NV            PPT with hooklying ABD NV            PPT with march NV            TA with clamshell NV            TA with (B) Tband  shoulder extension NV                                      Ther Ex             Treadmill ambulation for cardiovascular training NV            Seated Pball flexion stretch (L, Center, R) NV Ther Activity                                       Gait Training                                       Modalities             ESTIM prn             MH/Cryo prn             IE/HEP RG

## 2023-08-31 ENCOUNTER — OFFICE VISIT (OUTPATIENT)
Dept: PHYSICAL THERAPY | Facility: CLINIC | Age: 58
End: 2023-08-31
Payer: COMMERCIAL

## 2023-08-31 DIAGNOSIS — M54.50 CHRONIC BILATERAL LOW BACK PAIN WITHOUT SCIATICA: Primary | ICD-10-CM

## 2023-08-31 DIAGNOSIS — G89.29 CHRONIC BILATERAL LOW BACK PAIN WITHOUT SCIATICA: Primary | ICD-10-CM

## 2023-08-31 PROCEDURE — 97112 NEUROMUSCULAR REEDUCATION: CPT | Performed by: PHYSICAL THERAPIST

## 2023-08-31 PROCEDURE — 97110 THERAPEUTIC EXERCISES: CPT | Performed by: PHYSICAL THERAPIST

## 2023-08-31 NOTE — PROGRESS NOTES
Daily Note     Today's date: 2023  Patient name: Steve Rae  : 1965  MRN: [id number]  Referring provider: Estuardo Rasmussen MD  Dx:   Encounter Diagnosis     ICD-10-CM    1. Chronic bilateral low back pain without sciatica  M54.50     G89.29                      Subjective: Hs been doing HEP, these are going well. Pains similar to at time of IE. Objective: See treatment diary below. Progressed core strengthening and self stretching program.      Assessment: Tolerated treatment well. Patient had fair control of dynamic core stability. Appropriately fatigued with inter    Plan: Continue per plan of care. Precautions: HTN, history of PE, use flexion bias for exercise for now    Manuals 23           Manual (B) HS stretch NV RG                                                  Neuro Re-Ed             PPT with ball sq. NV 5sec   x 10           PPT with hooklying ABD NV Blue  5sec   x 10           PPT with march NV X 10 each           TA with clamshell NV 3sec x 10             TA with (B) Tband  shoulder extension NV NV                                     Ther Ex             Treadmill ambulation for cardiovascular training NV 5 min  1. 3mph           Seated Pball flexion stretch (L, Center, R) NV 10sec x 10 FWD, x 5 each side           Review HEP  SKTC/DKTC                                                                            Ther Activity                                       Gait Training                                       Modalities             ESTIM prn             MH/Cryo prn             IE/HEP RG

## 2023-09-07 ENCOUNTER — APPOINTMENT (OUTPATIENT)
Dept: PHYSICAL THERAPY | Facility: CLINIC | Age: 58
End: 2023-09-07
Payer: COMMERCIAL

## 2023-09-12 ENCOUNTER — OFFICE VISIT (OUTPATIENT)
Dept: PHYSICAL THERAPY | Facility: CLINIC | Age: 58
End: 2023-09-12
Payer: COMMERCIAL

## 2023-09-12 DIAGNOSIS — M54.50 CHRONIC BILATERAL LOW BACK PAIN WITHOUT SCIATICA: Primary | ICD-10-CM

## 2023-09-12 DIAGNOSIS — G89.29 CHRONIC BILATERAL LOW BACK PAIN WITHOUT SCIATICA: Primary | ICD-10-CM

## 2023-09-12 PROCEDURE — 97110 THERAPEUTIC EXERCISES: CPT | Performed by: PHYSICAL THERAPIST

## 2023-09-12 PROCEDURE — 97112 NEUROMUSCULAR REEDUCATION: CPT | Performed by: PHYSICAL THERAPIST

## 2023-09-12 NOTE — PROGRESS NOTES
Daily Note     Today's date: 2023  Patient name: Cookie Adorno  : 1965  MRN: [id number]  Referring provider: Carlos Yanez MD  Dx:   Encounter Diagnosis     ICD-10-CM    1. Chronic bilateral low back pain without sciatica  M54.50     G89.29                      Subjective: Low back pain comes a goes. Stairs aggravating to back and knees. Scheduled for lumbar injection at the end of the month. Objective: See treatment diary below. Progressed core strengthening with fair palpable deep core contraction. Assessment: Needed some verbal cueing to help better engage abdominals with standing core strengthening activities. Still overall early in rehab process. Pain with ADLs should improve as flexibility and core strength improve. Patient would benefit from continued course of skilled physical therapy to address impairments in an effort to improve function. Plan: Continue per plan of care. Precautions: HTN, history of PE, use flexion bias for exercise for now    Manuals 23          Manual (B) HS stretch NV RG RG                                                 Neuro Re-Ed             PPT with ball sq. NV 5sec   x 10 5sec x 10          PPT with hooklying ABD NV Blue  5sec   x 10 5sec x 10          PPT with march NV X 10 each X 10 each          TA with clamshell NV 3sec x 10   Orange  3sec x 10          PPT with heel slide   X 10          TA with (B) Tband  shoulder extension NV NV Green  3sec x 10          TA with antirotation press   Green 5 sec x 10                       Ther Ex             Treadmill ambulation for cardiovascular training NV 5 min  1. 3mph 5min  1.3 mh          Seated Pball flexion stretch (L, Center, R) NV 10sec x 10 FWD, x 5 each side 10sec x 10  X 10 FWD            Review HEP  SKTC/DKTC                                                                            Ther Activity                                       Gait Training Modalities             ESTIM prn             MH/Cryo prn             IE/HEP RG

## 2023-09-14 ENCOUNTER — OFFICE VISIT (OUTPATIENT)
Dept: PHYSICAL THERAPY | Facility: CLINIC | Age: 58
End: 2023-09-14
Payer: COMMERCIAL

## 2023-09-14 DIAGNOSIS — G89.29 CHRONIC BILATERAL LOW BACK PAIN WITHOUT SCIATICA: Primary | ICD-10-CM

## 2023-09-14 DIAGNOSIS — M54.50 CHRONIC BILATERAL LOW BACK PAIN WITHOUT SCIATICA: Primary | ICD-10-CM

## 2023-09-14 PROCEDURE — 97112 NEUROMUSCULAR REEDUCATION: CPT | Performed by: PHYSICAL THERAPIST

## 2023-09-14 PROCEDURE — 97110 THERAPEUTIC EXERCISES: CPT | Performed by: PHYSICAL THERAPIST

## 2023-09-14 NOTE — PROGRESS NOTES
Daily Note     Today's date: 2023  Patient name: Skinny Pierce  : 1965  MRN: [id number]  Referring provider: Frederick Castellanos MD  Dx:   Encounter Diagnosis     ICD-10-CM    1. Chronic bilateral low back pain without sciatica  M54.50     G89.29                      Subjective: Having some R posterior LE pain vs back pain. Slipped and fell recently so wonders f thad is from that episode. Objective: See treatment diary below. Progressed LE strengthening program.    Assessment: Better palpable engagement of deep core with standing today. Making progress. Pain with ADLs should improve as flexibility and core strength improve. Patient would benefit from continued course of skilled physical therapy to address impairments in an effort to improve function. Plan: Continue per plan of care. Precautions: HTN, history of PE, use flexion bias for exercise for now    Manuals 23         Manual (B) HS stretch NV RG RG RG                                                Neuro Re-Ed             PPT with ball sq. NV 5sec   x 10 5sec x 10 5sec x 10         PPT with hooklying ABD NV Blue  5sec   x 10 5sec x 10 Blue  5sec x 10         PPT with march NV X 10 each X 10 each X 10         TA with clamshell NV 3sec x 10   Orange  3sec x 10 Orange  3sec x 12 each         PPT with heel slide   X 10 X 10         TA with (B) Tband  shoulder extension NV NV Green  3sec x 10 Green  3sec  X 1 0         TA with antirotation press   Green 5 sec x 10 Green  5sex x 10                      Ther Ex             Treadmill ambulation for cardiovascular training NV 5 min  1. 3mph 5min  1.3 mh 5min  1.5 mph         Seated Pball flexion stretch (L, Center, R) NV 10sec x 10 FWD, x 5 each side 10sec x 10  X 10 FWD   10sec x 10 FWD x 5 each side         Review HEP  SKTC/DKTC           Leg Press st 8    65#  2 x 10                                                             Ther Activity                                       Gait Training                                       Modalities             ESTIM prn             MH/Cryo prn             IE/HEP RG

## 2023-09-19 ENCOUNTER — APPOINTMENT (OUTPATIENT)
Dept: PHYSICAL THERAPY | Facility: CLINIC | Age: 58
End: 2023-09-19
Payer: COMMERCIAL

## 2023-09-22 ENCOUNTER — APPOINTMENT (OUTPATIENT)
Dept: PHYSICAL THERAPY | Facility: CLINIC | Age: 58
End: 2023-09-22
Payer: COMMERCIAL

## 2023-09-27 ENCOUNTER — OFFICE VISIT (OUTPATIENT)
Dept: PHYSICAL THERAPY | Facility: CLINIC | Age: 58
End: 2023-09-27
Payer: COMMERCIAL

## 2023-09-27 DIAGNOSIS — G89.29 CHRONIC BILATERAL LOW BACK PAIN WITHOUT SCIATICA: Primary | ICD-10-CM

## 2023-09-27 DIAGNOSIS — M54.50 CHRONIC BILATERAL LOW BACK PAIN WITHOUT SCIATICA: Primary | ICD-10-CM

## 2023-09-27 PROCEDURE — 97140 MANUAL THERAPY 1/> REGIONS: CPT | Performed by: PHYSICAL THERAPIST

## 2023-09-27 PROCEDURE — 97110 THERAPEUTIC EXERCISES: CPT | Performed by: PHYSICAL THERAPIST

## 2023-09-27 NOTE — PROGRESS NOTES
Daily Note     Today's date: 2023  Patient name: Shey Orellana  : 1965  MRN: [id number]  Referring provider: Nando Michel MD  Dx:   Encounter Diagnosis     ICD-10-CM    1. Chronic bilateral low back pain without sciatica  M54.50     G89.29           Start Time: 1303  Stop Time: 1355  Total time in clinic (min): 52 minutes    Subjective: Since last visit had a trip to ER on 23 with increased low back pain and R LE weakness. Onset of these symptoms the previous day getting out of bed. Had a walker at home which she had to use. Was given IV pain medication in emergency room. Was prescribed oral prednisone, has since completed course of this. Feeling a lot better now  No back pain at present, still feels some weakness and parethesias into R posteromedial and posterolateral leg. Has been out of work and has not tried any of her home exercises since ER visit. Objective: See treatment diary below  R LE myotomes are 5/5    Has some R sided low back and gluteal pain with R Rotation    (+) R > L piriformis tightness, addressed with manual  Tender to palpation R piriformis    Resumed some limited therex today. Assessment: Exacerbation of low back and R sided radicular pain since last visit, doing better with medication. Had to modify program today secondary to gluteal pain, cryo effective for pain control. Plan: Continue per plan of care. She is waiting for [facility] to call her to schedule lumbar SLIME. Advised her to resume flexion biased HEP. Precautions: HTN, history of PE, use flexion bias for exercise for now    Manuals 23        Manual (B) HS stretch NV RG RG RG RG        Manual (B) piriformis stretch     RG        Theragun R piriformis     1750 x 3 min                     Neuro Re-Ed             PPT with ball sq.  NV 5sec   x 10 5sec x 10 5sec x 10 5sec x 10        PPT with hooklying ABD NV Blue  5sec   x 10 5sec x 10 Blue  5sec x 10 Blue  5sec x 10        PPT with march NV X 10 each X 10 each X 10         TA with clamshell NV 3sec x 10   Orange  3sec x 10 Orange  3sec x 12 each No band  3sec x 12        PPT with heel slide   X 10 X 10         TA with (B) Tband  shoulder extension NV NV Green  3sec x 10 Green  3sec  X 1 0         TA with antirotation press   Green 5 sec x 10 Green  5sex x 10         PPT     X 10        LTR     attempted, had pain on R side even with limited range. Ther Ex             Treadmill ambulation for cardiovascular training NV 5 min  1. 3mph 5min  1.3 mh 5min  1.5 mph         Seated Pball flexion stretch (L, Center, R) NV 10sec x 10 FWD, x 5 each side 10sec x 10  X 10 FWD   10sec x 10 FWD x 5 each side 10sec c 0 FWD, x 5 each side        Review HEP  SKTC/DKTC           Leg Press st 8    65#  2 x 10                                                             Ther Activity                                       Gait Training                                       Modalities             ESTIM prn             MH/Cryo prn     Cryo x 10 min        IE/HEP RG

## 2023-10-03 ENCOUNTER — OFFICE VISIT (OUTPATIENT)
Dept: PHYSICAL THERAPY | Facility: CLINIC | Age: 58
End: 2023-10-03
Payer: COMMERCIAL

## 2023-10-03 DIAGNOSIS — G89.29 CHRONIC BILATERAL LOW BACK PAIN WITHOUT SCIATICA: Primary | ICD-10-CM

## 2023-10-03 DIAGNOSIS — M54.50 CHRONIC BILATERAL LOW BACK PAIN WITHOUT SCIATICA: Primary | ICD-10-CM

## 2023-10-03 PROCEDURE — 97110 THERAPEUTIC EXERCISES: CPT | Performed by: PHYSICAL THERAPIST

## 2023-10-03 PROCEDURE — 97112 NEUROMUSCULAR REEDUCATION: CPT | Performed by: PHYSICAL THERAPIST

## 2023-10-03 PROCEDURE — 97140 MANUAL THERAPY 1/> REGIONS: CPT | Performed by: PHYSICAL THERAPIST

## 2023-10-03 NOTE — PROGRESS NOTES
Daily Note     Today's date: 10/3/2023  Patient name: Lawrence Sanders  : 1965  MRN: [id number]  Referring provider: Regi Chavez MD  Dx:   Encounter Diagnosis     ICD-10-CM    1. Chronic bilateral low back pain without sciatica  M54.50     G89.29           Start Time: 1405  Stop Time: 1458  Total time in clinic (min): 53 minutes    Subjective: Received lumbar SLIME last Thursday, felt some weakness in R leg immediately following this, but feeling better now. No LE symptoms has some tightness across low back. Objective: See treatment diary below. Resumed more dynamic core stability program.  R > L piriformis tightness noted with manuals. Assessment: Tolerated treatment well. Less radicular pain, seems to have responded (+) to [facility] last week. Encouraged walking program for conditioning/strengthening/weight loss. Plan: Continue per plan of care. Precautions: HTN, history of PE, use flexion bias for exercise for now    Manuals 8/29/23 8/31 9/12 9/14 9/27 10/3       Manual (B) HS stretch NV RG RG RG RG RG       Manual (B) piriformis stretch     RG RG       Theragun R piriformis     1750 x 3 min                     Neuro Re-Ed             PPT with ball sq. NV 5sec   x 10 5sec x 10 5sec x 10 5sec x 10 5sec 10       PPT with hooklying ABD NV Blue  5sec   x 10 5sec x 10 Blue  5sec x 10 Blue  5sec x 10 Uoml1sud x 10       PPT with march NV X 10 each X 10 each X 10  X 10       TA with clamshell NV 3sec x 10   Orange  3sec x 10 Orange  3sec x 12 each No band  3sec x 12 No band  3sec x 12       PPT with heel slide   X 10 X 10  X 10 each       TA with (B) Tband  shoulder extension NV NV Green  3sec x 10 Green  3sec  X 1 0         TA with antirotation press   Green 5 sec x 10 Green  5sex x 10         PPT     X 10        LTR     attempted, had pain on R side even with limited range. Ther Ex             Treadmill ambulation for cardiovascular training NV 5 min  1. 3mph 5min  1.3 mh 5min  1.5 mph  5min  1.5 mph       Seated Pball flexion stretch (L, Center, R) NV 10sec x 10 FWD, x 5 each side 10sec x 10  X 10 FWD   10sec x 10 FWD x 5 each side 10sec c 0 FWD, x 5 each side 10sec  X 10 FWD x 5 each side       Review HEP  SKTC/DKTC           Leg Press st 8    65#  2 x 10                                                             Ther Activity                                       Gait Training                                       Modalities             ESTIM prn             MH/Cryo prn     Cryo x 10 min Cryo x 10 min       IE/HEP RG

## 2023-10-05 ENCOUNTER — OFFICE VISIT (OUTPATIENT)
Dept: PHYSICAL THERAPY | Facility: CLINIC | Age: 58
End: 2023-10-05
Payer: COMMERCIAL

## 2023-10-05 DIAGNOSIS — M54.50 CHRONIC BILATERAL LOW BACK PAIN WITHOUT SCIATICA: Primary | ICD-10-CM

## 2023-10-05 DIAGNOSIS — G89.29 CHRONIC BILATERAL LOW BACK PAIN WITHOUT SCIATICA: Primary | ICD-10-CM

## 2023-10-05 PROCEDURE — 97140 MANUAL THERAPY 1/> REGIONS: CPT | Performed by: PHYSICAL THERAPIST

## 2023-10-05 PROCEDURE — 97112 NEUROMUSCULAR REEDUCATION: CPT | Performed by: PHYSICAL THERAPIST

## 2023-10-05 PROCEDURE — 97110 THERAPEUTIC EXERCISES: CPT | Performed by: PHYSICAL THERAPIST

## 2023-10-05 NOTE — PROGRESS NOTES
Daily Note     Today's date: 10/5/2023  Patient name: Diana Masters  : 1965  MRN: [id number]  Referring provider: Doug Soto MD  Dx:   Encounter Diagnosis     ICD-10-CM    1. Chronic bilateral low back pain without sciatica  M54.50     G89.29                      Subjective: Felt sitffneess in low back and R buttocks getting up from bed this AM. Tried some knee to chest stretching. Had some pain with this, but also told me she tried to tighten core while stretching. No pain at present. Objective: See treatment diary below. Progressed core strengthening program.      Assessment: Tolerated treatment well. Fatigued easily with SLR secondary to weakness. Reviewed single and double knee to chest stretching without core contrction. Needs to continue to build strength and flexibility . Plan: Continue per plan of care. Precautions: HTN, history of PE, use flexion bias for exercise for now    Manuals 8/29/23 8/31 9/12 9/14 9/27 10/3 10/5      Manual (B) HS stretch NV RG RG RG RG RG RG      Manual (B) piriformis stretch     RG RG RG      Theragun R piriformis     1750 x 3 min                     Neuro Re-Ed             PPT with ball sq. NV 5sec   x 10 5sec x 10 5sec x 10 5sec x 10 5sec 10 5sec c 10      PPT with hooklying ABD NV Blue  5sec   x 10 5sec x 10 Blue  5sec x 10 Blue  5sec x 10 [id number] x 10 Purple  5sec  2 x 12      PPT with march NV X 10 each X 10 each X 10  X 10       TA with clamshell NV 3sec x 10   Orange  3sec x 10 Orange  3sec x 12 each No band  3sec x 12 No band  3sec x 12 NO band  3sec x 12      PPT with heel slide   X 10 X 10  X 10 each X 10 each      TA with (B) Tband  shoulder extension NV NV Green  3sec x 10 Green  3sec  X 1 0         TA with antirotation press   Green 5 sec x 10 Green  5sex x 10         PPT     X 10        TA with SLR flexion       X 4 each side      LTR     attempted, had pain on R side even with limited range.         Ther Ex             Treadmill ambulation for cardiovascular training NV 5 min  1. 3mph 5min  1.3 mh 5min  1.5 mph  5min  1.5 mph 5min  1.5 mph      Seated Pball flexion stretch (L, Center, R) NV 10sec x 10 FWD, x 5 each side 10sec x 10  X 10 FWD   10sec x 10 FWD x 5 each side 10sec c 0 FWD, x 5 each side 10sec  X 10 FWD x 5 each side 10sec  X 10 FWD x 5 each side      Review HEP  SKTC/DKTC           Leg Press st 8    65#  2 x 10   65#  3 x 10                                                          Ther Activity                                       Gait Training                                       Modalities             ESTIM prn             MH/Cryo prn     Cryo x 10 min Cryo x 10 min Cryo x 10 min      IE/HEP RG

## 2023-10-10 ENCOUNTER — OFFICE VISIT (OUTPATIENT)
Dept: PHYSICAL THERAPY | Facility: CLINIC | Age: 58
End: 2023-10-10
Payer: COMMERCIAL

## 2023-10-10 DIAGNOSIS — M54.50 CHRONIC BILATERAL LOW BACK PAIN WITHOUT SCIATICA: Primary | ICD-10-CM

## 2023-10-10 DIAGNOSIS — G89.29 CHRONIC BILATERAL LOW BACK PAIN WITHOUT SCIATICA: Primary | ICD-10-CM

## 2023-10-10 PROCEDURE — 97140 MANUAL THERAPY 1/> REGIONS: CPT | Performed by: PHYSICAL THERAPIST

## 2023-10-10 PROCEDURE — 97110 THERAPEUTIC EXERCISES: CPT | Performed by: PHYSICAL THERAPIST

## 2023-10-10 PROCEDURE — 97112 NEUROMUSCULAR REEDUCATION: CPT | Performed by: PHYSICAL THERAPIST

## 2023-10-10 NOTE — PROGRESS NOTES
Daily Note     Today's date: 10/10/2023  Patient name: Rhoda Mcgee  : 1965  MRN: [id number]  Referring provider: Wilton Franco MD  Dx:   Encounter Diagnosis     ICD-10-CM    1. Chronic bilateral low back pain without sciatica  M54.50     G89.29                      Subjective: Patient reports that over the weekend she was going up and down stairs a lot, which caused her back to "act up". Denies radicular symptoms. Notes that her HEP is going ok, but the DKTC stretching remains difficult. Objective: See treatment diary below. Progressed core strengthening program.      Assessment: Patient able to return to previously established core exercises this session. She continues to fatigue easily with glute and hip flexor strengthening. Patient remains challenged by current POC secondary to back pain and proximal LE weakness. Continue to progress as tolerated. Patient will continue to benefit from skilled PT in order to address impairments and improve function. Plan: Continue per plan of care. Precautions: HTN, history of PE, use flexion bias for exercise for now    Manuals 8/29/23 8/31 9/12 9/14 9/27 10/3 10/5 10/10     Manual (B) HS stretch NV RG RG RG RG RG RG JF     Manual (B) piriformis stretch     RG RG RG JF     Theragun R piriformis     1750 x 3 min                     Neuro Re-Ed             PPT with ball sq.  NV 5sec   x 10 5sec x 10 5sec x 10 5sec x 10 5sec 10 5sec c 10 5" sec  x10     PPT with hooklying ABD NV Blue  5sec   x 10 5sec x 10 Blue  5sec x 10 Blue  5sec x 10 [id number] x 10 Purple  5sec  2 x 12 Purple  5" hold  2x12     PPT with march NV X 10 each X 10 each X 10  X 10       TA with clamshell NV 3sec x 10   Orange  3sec x 10 Orange  3sec x 12 each No band  3sec x 12 No band  3sec x 12 NO band  3sec x 12 No band  3" hold  x12     PPT with heel slide   X 10 X 10  X 10 each X 10 each x10 ea     TA with (B) Tband  shoulder extension NV NV Green  3sec x 10 Green  3sec  X 1 0    Green 5" hold  x10     TA with antirotation press   Green 5 sec x 10 Green  5sex x 10    Green  5" hold  x10     PPT     X 10        TA with SLR flexion       X 4 each side x4 ea side     LTR     attempted, had pain on R side even with limited range. Ther Ex             Treadmill ambulation for cardiovascular training NV 5 min  1. 3mph 5min  1.3 mh 5min  1.5 mph  5min  1.5 mph 5min  1.5 mph 5 min   1.5 mph     Seated Pball flexion stretch (L, Center, R) NV 10sec x 10 FWD, x 5 each side 10sec x 10  X 10 FWD   10sec x 10 FWD x 5 each side 10sec c 0 FWD, x 5 each side 10sec  X 10 FWD x 5 each side 10sec  X 10 FWD x 5 each side 10x10"  x10  FWD     x5 ea side     Review HEP  SKTC/DKTC           Leg Press st 8    65#  2 x 10   65#  3 x 10 Held 2* to fatigue                                                         Ther Activity                                       Gait Training                                       Modalities             ESTIM prn             MH/Cryo prn     Cryo x 10 min Cryo x 10 min Cryo x 10 min Cryo  x10 min     IE/HEP RG

## 2023-10-12 ENCOUNTER — OFFICE VISIT (OUTPATIENT)
Dept: PHYSICAL THERAPY | Facility: CLINIC | Age: 58
End: 2023-10-12
Payer: COMMERCIAL

## 2023-10-12 DIAGNOSIS — G89.29 CHRONIC BILATERAL LOW BACK PAIN WITHOUT SCIATICA: Primary | ICD-10-CM

## 2023-10-12 DIAGNOSIS — M54.50 CHRONIC BILATERAL LOW BACK PAIN WITHOUT SCIATICA: Primary | ICD-10-CM

## 2023-10-12 PROCEDURE — 97112 NEUROMUSCULAR REEDUCATION: CPT

## 2023-10-12 PROCEDURE — 97110 THERAPEUTIC EXERCISES: CPT

## 2023-10-12 PROCEDURE — 97140 MANUAL THERAPY 1/> REGIONS: CPT

## 2023-10-12 NOTE — PROGRESS NOTES
Daily Note     Today's date: 10/12/2023  Patient name: Mercedez Sullivan  : 1965  MRN: [id number]  Referring provider: Shonna Combs MD  Dx:   Encounter Diagnosis     ICD-10-CM    1. Chronic bilateral low back pain without sciatica  M54.50     G89.29                      Subjective: Patient feeling generally fatigued today with no reported negative changes in LB pain. Objective: See treatment diary below. Assessment: Manual stretching continues to help improve hamstring and piriformis flexibility. Fatigues with TA and proximal hip strengthening secondary to weakness. Making steady progress with current POC. Will continue to benefit from skilled PT to further address core weakness, decreased flexibility, and improve hip strength. Plan: Continue per plan of care. Precautions: HTN, history of PE, use flexion bias for exercise for now    Manuals 8/29/23 8/31 9/12 9/14 9/27 10/3 10/5 10/10 10/12    Manual (B) HS stretch NV RG RG RG RG RG RG JF E[facility]    Manual (B) piriformis stretch     RG RG RG JF EH    Theragun R piriformis     1750 x 3 min                     Neuro Re-Ed             PPT with ball sq.  NV 5sec   x 10 5sec x 10 5sec x 10 5sec x 10 5sec 10 5sec c 10 5" sec  x10 5"x10    PPT with hooklying ABD NV Blue  5sec   x 10 5sec x 10 Blue  5sec x 10 Blue  5sec x 10 [id number] x 10 Purple  5sec  2 x 12 Purple  5" hold  2x12 Purple  5" hold,  2x12    PPT with march NV X 10 each X 10 each X 10  X 10       TA with clamshell NV 3sec x 10   Orange  3sec x 10 Orange  3sec x 12 each No band  3sec x 12 No band  3sec x 12 NO band  3sec x 12 No band  3" hold  x12 No band  3"x12    PPT with heel slide   X 10 X 10  X 10 each X 10 each x10 ea X10  ea    TA with (B) Tband  shoulder extension NV NV Green  3sec x 10 Green  3sec  X 1 0    Green 5" hold  x10 GTB  5"x10    TA with antirotation press   Green 5 sec x 10 Green  5sex x 10    Green  5" hold  x10 GTB  5"x10    PPT     X 10        TA with SLR flexion X 4 each side x4 ea side x4 ea side    LTR     attempted, had pain on R side even with limited range. Ther Ex             Treadmill ambulation for cardiovascular training NV 5 min  1. 3mph 5min  1.3 mh 5min  1.5 mph  5min  1.5 mph 5min  1.5 mph 5 min   1.5 mph 1.6 mph  5 min    Seated Pball flexion stretch (L, Center, R) NV 10sec x 10 FWD, x 5 each side 10sec x 10  X 10 FWD   10sec x 10 FWD x 5 each side 10sec c 0 FWD, x 5 each side 10sec  X 10 FWD x 5 each side 10sec  X 10 FWD x 5 each side 10x10"  x10  FWD     x5 ea side 10" hold    Fwd  x10    x5 ea side    Review HEP  SKTC/DKTC           Leg Press st 8    65#  2 x 10   65#  3 x 10 Held 2* to fatigue                                                         Ther Activity                                       Gait Training                                       Modalities             ESTIM prn             MH/Cryo prn     Cryo x 10 min Cryo x 10 min Cryo x 10 min Cryo  x10 min CP  10 min post    IE/HEP RG

## 2023-10-17 ENCOUNTER — OFFICE VISIT (OUTPATIENT)
Dept: PHYSICAL THERAPY | Facility: CLINIC | Age: 58
End: 2023-10-17
Payer: COMMERCIAL

## 2023-10-17 DIAGNOSIS — G89.29 CHRONIC BILATERAL LOW BACK PAIN WITHOUT SCIATICA: Primary | ICD-10-CM

## 2023-10-17 DIAGNOSIS — M54.50 CHRONIC BILATERAL LOW BACK PAIN WITHOUT SCIATICA: Primary | ICD-10-CM

## 2023-10-17 PROCEDURE — 97110 THERAPEUTIC EXERCISES: CPT | Performed by: PHYSICAL THERAPIST

## 2023-10-17 PROCEDURE — 97112 NEUROMUSCULAR REEDUCATION: CPT | Performed by: PHYSICAL THERAPIST

## 2023-10-17 PROCEDURE — 97140 MANUAL THERAPY 1/> REGIONS: CPT | Performed by: PHYSICAL THERAPIST

## 2023-10-17 NOTE — PROGRESS NOTES
Progress Note     Today's date: 10/17/2023  Patient name: Carol Pinto  : 1965  MRN: [id number]  Referring provider: Maribell Morales MD  Dx:   Encounter Diagnosis     ICD-10-CM    1. Chronic bilateral low back pain without sciatica  M54.50     G89.29                      Subjective: R leg feels weird and tired with walking. Has some radiating discomfort with increased periods on feet down lateral R thigh and into lateral shin. She wonders if any of this pain could be coming from her hip. Has noticed when she is sitting if she tries to contract her core her back and gluteal pain are not as bad. Objective: See treatment diary below. Lumbar Comments  Lumbar AROM:  Flexion: 100%, R posterior R LE pain  Extension:100%  R SB:75%  L SB: 75%,   R ROT: 100%,  L ROT: 100%       Repeated Testing:  Flexion: Increased R low back pain, Increased R > L posterior LE pain  Extension : Increased R anterior  thigh pain.      LE Myotomes:5/5 (B)        Special Testing:  SLUMP (-) (B)     LE Flexibility  90-90 Hamstrings:  R: 20 degrees  L: 25 degrees        R hip ROM is WNL, Scour testing (-)    Palpation:Has mild tenderness at R greater trochanter and proximal 1/2 of ITband               Core strength:  Deep Core Muscle Contraction Scale Score: 7/10  Deep Muscle Contraction Scale :   Quality of contraction:2  No contraction : 0  Rapid, superficial contraction: 1  Just perceptible contraction: 2  Gentle, slow contraction: 3     Substitution:2  Resting substitution:0  Moderate to strong substitution:1  Subtle perceptible substitution:2  No substitution:3        Symmetry:2  Unilateral contraction:0  Bilateral but asymmetrical contraction:1  Symmetrical contraction:2      Breathin  Inability or difficulty with breathing during contraction: 0  Able to hold contraction while maintaining breathin     Holdin  Holding < 10 seconds:  0  Holding > 10 seconds: 1        Gait: WNL  Symmetry:2  Unilateral contraction:0  Bilateral but asymmetrical contraction:1  Symmetrical contraction:2      Breathing:   Inability or difficulty with breathing during contraction: 0  Able to hold contraction while maintaining breathin     Holdin  Holding < 10 seconds:  0  Holding > 10 seconds: 1      Gait: WNL       Assessment: Assessment  Patient is a 66 y.o. female who  presents with pain, range of motion loss, weakness associated with a diagnosis of lumbar DJD. Has functional limitations as a result of impairments. Has made some flexibility, core strength and pain gains since ith pain starting therapy and in conjunction with pain management intervention. Still needs to build strength. I advised her that while some of her lateral hip pain may be related to bursitis, I think that the majority of her radiating R LE pain is coming from L/S. Exercise including cardiovascular exercise, core and LE strengthening and weight loss are her best long term solutions to for her pain. Plan Plan  Patient would benefit from: skilled PT  Planned modality interventions: biofeedback, cryotherapy, TENS, thermotherapy: hydrocollator packs and ultrasound  Planned therapy interventions: abdominal trunk stabilization, body mechanics training, breathing training, flexibility, functional ROM exercises, gait training, home exercise program, joint mobilization, manual therapy, neuromuscular re-education, patient education, self care, strengthening, stretching, therapeutic activities, therapeutic exercise and therapeutic training  Frequency: 2x's per week x 4 weeks. Precautions: HTN, history of PE, use flexion bias for exercise for now    Manuals 8/29/23 8/31 9/12 9/14 9/27 10/3 10/5 10/10 10/12 10/17   Manual (B) HS stretch NV RG RG RG RG RG RG JF [facility]   Manual (B) piriformis stretch     RG RG RG JF  RG   Theragun R piriformis     1750 x 3 min                     Neuro Re-Ed             PPT with ball sq.  NV 5sec   x 10 5sec x 10 5sec x 10 5sec x 10 5sec 10 5sec c 10 5" sec  x10 5"x10    PPT with hooklying ABD NV Blue  5sec   x 10 5sec x 10 Blue  5sec x 10 Blue  5sec x 10 [id number] x 10 Purple  5sec  2 x 12 Purple  5" hold  2x12 Purple  5" hold,  2x12 Purple 5sec  2 x 12   PPT with march NV X 10 each X 10 each X 10  X 10       TA with clamshell NV 3sec x 10   Orange  3sec x 10 Orange  3sec x 12 each No band  3sec x 12 No band  3sec x 12 NO band  3sec x 12 No band  3" hold  x12 No band  3"x12 3sec x 12   PPT with heel slide   X 10 X 10  X 10 each X 10 each x10 ea X10  ea X 10 each   TA with (B) Tband  shoulder extension NV NV Green  3sec x 10 Green  3sec  X 1 0    Green 5" hold  x10 GTB  5"x10    TA with antirotation press   Green 5 sec x 10 Green  5sex x 10    Green  5" hold  x10 GTB  5"x10    PPT     X 10        TA with SLR flexion       X 4 each side x4 ea side x4 ea side  X 5 each   LTR     attempted, had pain on R side even with limited range. Seated TA contraction with pball on lap, shoulder extension into ball1          10sec x 10   Ther Ex             Treadmill ambulation for cardiovascular training NV 5 min  1. 3mph 5min  1.3 mh 5min  1.5 mph  5min  1.5 mph 5min  1.5 mph 5 min   1.5 mph 1.6 mph  5 min 1.5 mph x 5 min   Seated Pball flexion stretch (L, Center, R) NV 10sec x 10 FWD, x 5 each side 10sec x 10  X 10 FWD   10sec x 10 FWD x 5 each side 10sec c 0 FWD, x 5 each side 10sec  X 10 FWD x 5 each side 10sec  X 10 FWD x 5 each side 10x10"  x10  FWD     x5 ea side 10" hold    Fwd  x10    x5 ea side 10sec hold x 10 FWD x 5 ech side.    Review HEP  SKTC/DKTC           Leg Press st 8    65#  2 x 10   65#  3 x 10 Held 2* to fatigue                                                         Ther Activity                                       Gait Training                                       Modalities             ESTIM prn             MH/Cryo prn     Cryo x 10 min Cryo x 10 min Cryo x 10 min Cryo  x10 min CP  10 min post CP x 10 min   IE/HEP [place]

## 2023-10-19 ENCOUNTER — OFFICE VISIT (OUTPATIENT)
Dept: PHYSICAL THERAPY | Facility: CLINIC | Age: 58
End: 2023-10-19
Payer: COMMERCIAL

## 2023-10-19 DIAGNOSIS — G89.29 CHRONIC BILATERAL LOW BACK PAIN WITHOUT SCIATICA: Primary | ICD-10-CM

## 2023-10-19 DIAGNOSIS — M54.50 CHRONIC BILATERAL LOW BACK PAIN WITHOUT SCIATICA: Primary | ICD-10-CM

## 2023-10-19 PROCEDURE — 97110 THERAPEUTIC EXERCISES: CPT | Performed by: PHYSICAL THERAPIST

## 2023-10-19 PROCEDURE — 97140 MANUAL THERAPY 1/> REGIONS: CPT | Performed by: PHYSICAL THERAPIST

## 2023-10-19 PROCEDURE — 97112 NEUROMUSCULAR REEDUCATION: CPT | Performed by: PHYSICAL THERAPIST

## 2023-10-19 NOTE — PROGRESS NOTES
Daily Note     Today's date: 10/19/2023  Patient name: Nallely Reno  : 1965  MRN: [id number]  Referring provider: Randall Vizcaino MD  Dx:   Encounter Diagnosis     ICD-10-CM    1. Chronic bilateral low back pain without sciatica  M54.50     G89.29           Start Time: 1432  Stop Time: 1532  Total time in clinic (min): 60 minutes    Subjective: "Pretty good," today from a pain perspective. Not feeling much pain, just tired. Objective: See treatment diary below. Progressed repetitions for core strengthening activities. Assessment: Tolerated treatment well. Patient demonstrated fatigue post treatment, exhibited good technique with therapeutic exercises, and would benefit from continued PT      Plan: Continue per plan of care. Precautions: HTN, history of PE, use flexion bias for exercise for now     Manuals 10/18            Manual (B) HS stretch RG            Manual (B) piriformis stretch  RG                                        Neuro Re-Ed              PPT with ball sq.  5sec x 15            PPT with hooklying ABD 5sec  X 20                         TA with clamshell 3sec x 12            PPT with heel slide NP            TA with (B) Tband  shoulder extension Green  5sec x 10            TA with antirotation press  Green  5sec x 10                         TA with SLR flexion   x5 eac hside                          Seated TA contraction with pball on lap, shoulder extension into ball1  10sec x 10            Ther Ex              Treadmill ambulation for cardiovascular training 1.5 mph x 5 min            Seated Pball flexion stretch (L, Center, R) 10sec x 10 FWD , x 5 each side                         Leg Press st 8                                                                                                              Ther Activity                                                                       Gait Training Modalities                       ESTIM prn                       Cryo prn  10 min

## 2023-10-24 ENCOUNTER — OFFICE VISIT (OUTPATIENT)
Dept: PHYSICAL THERAPY | Facility: CLINIC | Age: 58
End: 2023-10-24
Payer: COMMERCIAL

## 2023-10-24 DIAGNOSIS — G89.29 CHRONIC BILATERAL LOW BACK PAIN WITHOUT SCIATICA: Primary | ICD-10-CM

## 2023-10-24 DIAGNOSIS — M54.50 CHRONIC BILATERAL LOW BACK PAIN WITHOUT SCIATICA: Primary | ICD-10-CM

## 2023-10-24 PROCEDURE — 97110 THERAPEUTIC EXERCISES: CPT | Performed by: PHYSICAL THERAPIST

## 2023-10-24 PROCEDURE — 97140 MANUAL THERAPY 1/> REGIONS: CPT | Performed by: PHYSICAL THERAPIST

## 2023-10-24 NOTE — PROGRESS NOTES
Daily Note     Today's date: 10/24/2023  Patient name: Carol Pinto  : 1965  MRN: [id number]  Referring provider: Maribell Morales MD  Dx:   Encounter Diagnosis     ICD-10-CM    1. Chronic bilateral low back pain without sciatica  M54.50     G89.29                        Subjective: Suffered a fall up highest stair over the weekend. Feels R hip did not lift up as high as it should of. Has some more soreness in area of R buttock and lateral hip. Objective: See treatment diary below. Progressed hip strengthening. Gait is normal on level ground. Assessment: Did well with progressions. Functional limitations persist as follows:  Stair ambulation difficult. Prolonged periods standing and walking contribute to pain. Decreased overall quality of life secondary to pain. Plan: Continue per plan of care. Precautions: HTN, history of PE, use flexion bias for exercise for now     Manuals 10/18 10/24           Manual (B) HS stretch RG RG           Manual (B) piriformis stretch  RG RG                                       Neuro Re-Ed              PPT with ball sq.  5sec x 15            PPT with hooklying ABD 5sec  X 20                         TA with clamshell 3sec x 12 3sec x12           PPT with heel slide NP 3sec x 12           TA with (B) Tband  shoulder extension Green  5sec x 10 Green  3sec  x 20           TA with antirotation press  Green  5sec x 10 Green  5sec x 10                        TA with SLR flexion   x5 eac hside 2 x 5                         Seated TA contraction with pball on lap, shoulder extension into ball1  10sec x 10            Ther Ex              Treadmill ambulation for cardiovascular training 1.5 mph x 5 min 1.5 mph x 5 min           Seated Pball flexion stretch (L, Center, R) 10sec x 10 FWD , x 5 each side 10sec x 10 FWD x 5 each side                        Leg Press st 8               MH ABD    10#  2 x 10 Ther Activity                                                                       Gait Training                                                                       Modalities                       ESTIM prn                       Cryo prn  10 min  10 min

## 2024-05-28 ENCOUNTER — OFFICE VISIT (OUTPATIENT)
Dept: OBGYN CLINIC | Facility: MEDICAL CENTER | Age: 59
End: 2024-05-28
Payer: COMMERCIAL

## 2024-05-28 ENCOUNTER — APPOINTMENT (OUTPATIENT)
Dept: RADIOLOGY | Facility: MEDICAL CENTER | Age: 59
End: 2024-05-28
Payer: COMMERCIAL

## 2024-05-28 VITALS — HEIGHT: 65 IN | WEIGHT: 293 LBS | BODY MASS INDEX: 48.82 KG/M2

## 2024-05-28 DIAGNOSIS — M79.672 PAIN IN LEFT FOOT: ICD-10-CM

## 2024-05-28 DIAGNOSIS — M79.672 PAIN IN LEFT FOOT: Primary | ICD-10-CM

## 2024-05-28 PROCEDURE — 73630 X-RAY EXAM OF FOOT: CPT

## 2024-05-28 PROCEDURE — 99213 OFFICE O/P EST LOW 20 MIN: CPT | Performed by: EMERGENCY MEDICINE

## 2024-05-28 RX ORDER — SEMAGLUTIDE 0.25 MG/.5ML
0.25 INJECTION, SOLUTION SUBCUTANEOUS WEEKLY
COMMUNITY
Start: 2024-05-09

## 2024-05-28 NOTE — PATIENT INSTRUCTIONS
You may use Advil (ibuprofen) 400-600mg every 6 hours or at least twice per day (OR Aleve (naproxen) 250-500mg every 12 hours as needed for pain and inflammation).  However do not mix or take other NSAIDs together such as Advil, Motrin, ibuprofen, Celebrex, naproxen, diclofenac or Aleve.    You may also take Tylenol (acetaminophen) together with Advil (ibuprofen) or Aleve (naproxen) as this is safe and can help decrease your pain levels.  The dosing for Tylenol is 500mg every 4-6 hours as needed OR max 1,000mg per dose up to 3 times per day for a total of 3,000mg per day  *Check with your primary care physician to see if these medications are safe to take and to make sure they do not interfere with your other medications and medical issues.        Recommend [employer] arch supports (I recommend the green and black RX Orthotic Arch 3/4 Length) on Amazon or their website [email or web address].  Wean into wearing, as your feet will need to get used to them.      [employer] [facility]    
no

## 2024-05-28 NOTE — PROGRESS NOTES
Assessment/Plan:    Diagnoses and all orders for this visit:    Pain in left foot  -     XR foot 3+ vw left; Future    Other orders  -     Semaglutide-Weight Management (Wegovy) 0.25 MG/0.5ML; Inject 0.25 mg under the skin Once a week    X-rays of the left foot were obtained and reviewed today with no acute findings or evidence of stress fracture.  There is some minimal degenerative changes about the tarsal metatarsal junction.  Discussed treatment options such as anti-inflammatories arch supports versus cushion insert.  Also discussed obtaining supportive shoes    Return in about 5 weeks (around 7/2/2024).      Subjective:   Patient ID: India Paniagua is a 58 y.o. female.    NP presents for about a month of Left lateral foot pain         Review of Systems    The following portions of the patient's chart were reviewed and updated as appropriate:   Allergy:    Allergies   Allergen Reactions    Seasonal Ic  [Cholestatin]        Medications:    Current Outpatient Medications:     ALPRAZolam (XANAX) 0.5 mg tablet, Take 1 tablet (0.5 mg total) by mouth daily at bedtime as needed for anxiety, Disp: 20 tablet, Rfl: 1    ergocalciferol (VITAMIN D2) 50,000 units, TAKE ONE CAPSULE BY MOUTH ONE TIME PER WEEK, Disp: 12 capsule, Rfl: 0    FLUoxetine (PROzac) 20 mg capsule, Take 20 mg by mouth daily, Disp: , Rfl:     hydrochlorothiazide (HYDRODIURIL) 25 mg tablet, Take 25 mg by mouth, Disp: , Rfl:     montelukast (SINGULAIR) 10 mg tablet, TAKE 1 TABLET BY MOUTH EVERY DAY, Disp: 30 tablet, Rfl: 0    neomycin-polymyxin-hydrocortisone (CORTISPORIN) 0.35%-10,000 units/mL-1% otic suspension, 3 ggts in ear(s) qid x 7 days, Disp: 10 mL, Rfl: 0    Semaglutide-Weight Management (Wegovy) 0.25 MG/0.5ML, Inject 0.25 mg under the skin Once a week, Disp: , Rfl:     SUMAtriptan (IMITREX) 100 mg tablet, Take 1 tablet (100 mg total) by mouth once as needed for migraine for up to 1 dose Pt requests BRAND NECCESSARY (Patient not taking: Reported  "on 11/3/2022), Disp: 10 tablet, Rfl: 1    Patient Active Problem List   Diagnosis    Allergic rhinitis    Bilateral hip pain    DDD (degenerative disc disease), lumbar    Essential hypertension    Lumbar foraminal stenosis    Lumbosacral spondylosis    Morbid obesity (HCC)    Obstructive sleep apnea treated with continuous positive airway pressure (CPAP)    Vitamin D deficiency    Generalized anxiety disorder    Migraine without aura and without status migrainosus, not intractable    Reflux esophagitis    Oropharyngeal dysphagia    Seasonal allergies    Primary osteoarthritis of both knees    CCC (chronic calculous cholecystitis)    Calculus of gallbladder without cholecystitis without obstruction       Objective:  Ht 5' 5\" (1.651 m)   Wt 136 kg (299 lb)   BMI 49.76 kg/m²     Left Ankle Exam     Other   Erythema: absent  Sensation: normal  Pulse: present    Comments:  Tenderness to palpation and mild swelling at the base of the fifth metatarsal, full range of motion with no pain with resistance            Physical Exam      Neurologic Exam    Procedures    I have personally reviewed pertinent films in PACS.            Past Medical History:   Diagnosis Date    Constipation     Hay fever     Pulmonary embolism (HCC)        Past Surgical History:   Procedure Laterality Date    HYSTERECTOMY  2013    MYOMECTOMY  1994    REDUCTION MAMMAPLASTY  2011       Social History     Socioeconomic History    Marital status: Single     Spouse name: Not on file    Number of children: Not on file    Years of education: Not on file    Highest education level: Not on file   Occupational History    Not on file   Tobacco Use    Smoking status: Never    Smokeless tobacco: Never   Vaping Use    Vaping status: Never Used   Substance and Sexual Activity    Alcohol use: Yes     Comment: socially    Drug use: No    Sexual activity: Not on file   Other Topics Concern    Not on file   Social History Narrative    Not on file     Social " Determinants of Health     Financial Resource Strain: Not on file   Food Insecurity: Not on file   Transportation Needs: Not on file   Physical Activity: Not on file   Stress: Not on file   Social Connections: Not on file   Intimate Partner Violence: Not on file   Housing Stability: Not on file       Family History   Problem Relation Age of Onset    Hypertension Mother     Rheum arthritis Mother     Prostate cancer Father     Hypertension Father     Heart attack Father     Glaucoma Father     Hypertension Maternal Grandmother     Stroke Maternal Grandmother     Heart attack Paternal Grandmother

## 2024-06-14 ENCOUNTER — OFFICE VISIT (OUTPATIENT)
Dept: OBGYN CLINIC | Facility: MEDICAL CENTER | Age: 59
End: 2024-06-14
Payer: COMMERCIAL

## 2024-06-14 ENCOUNTER — APPOINTMENT (OUTPATIENT)
Dept: RADIOLOGY | Facility: MEDICAL CENTER | Age: 59
End: 2024-06-14
Payer: COMMERCIAL

## 2024-06-14 VITALS
WEIGHT: 293 LBS | BODY MASS INDEX: 48.82 KG/M2 | HEIGHT: 65 IN | HEART RATE: 76 BPM | DIASTOLIC BLOOD PRESSURE: 97 MMHG | SYSTOLIC BLOOD PRESSURE: 153 MMHG

## 2024-06-14 DIAGNOSIS — M25.561 RIGHT KNEE PAIN, UNSPECIFIED CHRONICITY: ICD-10-CM

## 2024-06-14 DIAGNOSIS — M17.12 PRIMARY OSTEOARTHRITIS OF LEFT KNEE: Primary | ICD-10-CM

## 2024-06-14 PROCEDURE — 99214 OFFICE O/P EST MOD 30 MIN: CPT | Performed by: ORTHOPAEDIC SURGERY

## 2024-06-14 PROCEDURE — 73564 X-RAY EXAM KNEE 4 OR MORE: CPT

## 2024-06-14 PROCEDURE — 20610 DRAIN/INJ JOINT/BURSA W/O US: CPT | Performed by: ORTHOPAEDIC SURGERY

## 2024-06-14 RX ORDER — BUPIVACAINE HYDROCHLORIDE 2.5 MG/ML
4 INJECTION, SOLUTION INFILTRATION; PERINEURAL
Status: COMPLETED | OUTPATIENT
Start: 2024-06-14 | End: 2024-06-14

## 2024-06-14 RX ORDER — METHYLPREDNISOLONE ACETATE 40 MG/ML
1 INJECTION, SUSPENSION INTRA-ARTICULAR; INTRALESIONAL; INTRAMUSCULAR; SOFT TISSUE
Status: COMPLETED | OUTPATIENT
Start: 2024-06-14 | End: 2024-06-14

## 2024-06-14 RX ADMIN — METHYLPREDNISOLONE ACETATE 1 ML: 40 INJECTION, SUSPENSION INTRA-ARTICULAR; INTRALESIONAL; INTRAMUSCULAR; SOFT TISSUE at 11:30

## 2024-06-14 RX ADMIN — BUPIVACAINE HYDROCHLORIDE 4 ML: 2.5 INJECTION, SOLUTION INFILTRATION; PERINEURAL at 11:30

## 2024-06-14 NOTE — PROGRESS NOTES
"Orthopaedic Surgery - Office Note  India Paniagua (58 y.o. female)   : 1965   MRN: [id number]  Encounter Date: 2024    Assessment / Plan    Bilateral knee osteoarthritis, left symptomatic today      The patient has an examination and x-rays consistent with left knee osteoarthritis.  I have discussed with the patient the pathophysiology of this diagnosis and reviewed how the examination correlates with this diagnosis.  Surgical vs conservative treatment options were discussed at length to included CS injection, PT,  visco supplementation  After discussing these treatment options, the patient elected for a CS injection today.     Referral placed for physical therapy.   Patient will call if she would like to proceed with visco supplementation   Follow-up:  Return if symptoms worsen or fail to improve.      Chief Complaint / Date of Onset  Left knee pain / May 2024  Injury Mechanism / Date  None  Surgery / Date  None    History of Present Illness   India Paniagua is a 58 y.o. female who presents for left knee pain.  Patient was last seen 3/08/22 and provided with a Synvisc One injection.  Pain started about 1 mos ago.  Patient localizes her pain to the posterior aspect of the knee.  Worse with activities improved with rest    Treatment Summary  Medications / Modalities  None  Bracing / Immobilization  None  Physical Therapy  None  Injections  Right knee Synvisc One 3/09/22  B/L knee 20  B/L knee 20  Prior Surgeries  None  Other Treatments  None    Employment / Current Status  [employer]    Sport / Organization / Current Status  None      Review of Systems  Pertinent items are noted in HPI.  All other systems were reviewed and are negative.      Physical Exam  /97   Pulse 76   Ht 5' 5\" (1.651 m)   Wt (!) 139 kg (306 lb)   BMI 50.92 kg/m²   Cons: Appears well.  No apparent distress.  Psych: Alert. Oriented x3.  Mood and affect normal.  Eyes: PERRLA, EOMI  Resp: Normal effort.  No audible " wheezing or stridor.  CV: Palpable pulse.  No discernable arrhythmia.  No LE edema.  Lymph:  No palpable cervical, axillary, or inguinal lymphadenopathy.  Skin: Warm.  No palpable masses.  No visible lesions.  Neuro: Normal muscle tone.  Normal and symmetric DTR's.     Left Knee Exam  Alignment:  Normal knee alignment.  Inspection:  No swelling. No edema. No erythema. No ecchymosis. No muscle atrophy.  Palpation:   Medial and lateral joint line tenderness.  ROM:  Knee Extension 0. Knee Flexion 110.  Strength:  5/5 quadriceps and hamstrings.  Stability:  No objective knee instability. Stable Varus / Valgus stress, Lachman, and Posterior drawer.  Tests:  No pertinent positive or negative tests.  Patella:  Patella tracks centrally without crepitus.  Neurovascular:  Sensation intact in DP/SP/Duval/Sa/T nerve distributions.  2+ DP & PT pulses.  Gait:  Normal.       Studies Reviewed  XR of left knee - tricompartmental degenerative changes no fracture or dislocation.       Large joint arthrocentesis: L knee  Universal Protocol:  Consent: Verbal consent obtained.  Consent given by: patient  Patient understanding: patient states understanding of the procedure being performed  Site marked: the operative site was marked  Patient identity confirmed: verbally with patient  Supporting Documentation  Indications: pain   Procedure Details  Location: knee - L knee  Needle size: 22 G  Ultrasound guidance: no  Approach: lateral  Medications administered: 4 mL bupivacaine 0.25 %; 1 mL methylPREDNISolone acetate 40 mg/mL    Patient tolerance: patient tolerated the procedure well with no immediate complications  Dressing:  Sterile dressing applied             Medical, Surgical, Family, and Social History  The patient's medical history, family history, and social history, were reviewed and updated as appropriate.    Past Medical History:   Diagnosis Date    Constipation     Hay fever     Pulmonary embolism (HCC)        Past Surgical History:    Procedure Laterality Date    HYSTERECTOMY  2013    MYOMECTOMY  1994    REDUCTION MAMMAPLASTY  2011       Family History   Problem Relation Age of Onset    Hypertension Mother     Rheum arthritis Mother     Prostate cancer Father     Hypertension Father     Heart attack Father     Glaucoma Father     Hypertension Maternal Grandmother     Stroke Maternal Grandmother     Heart attack Paternal Grandmother        Social History     Occupational History    Not on file   Tobacco Use    Smoking status: Never    Smokeless tobacco: Never   Vaping Use    Vaping status: Never Used   Substance and Sexual Activity    Alcohol use: Yes     Comment: socially    Drug use: No    Sexual activity: Not on file       Allergies   Allergen Reactions    Seasonal Ic  [Cholestatin]          Current Outpatient Medications:     ALPRAZolam (XANAX) 0.5 mg tablet, Take 1 tablet (0.5 mg total) by mouth daily at bedtime as needed for anxiety, Disp: 20 tablet, Rfl: 1    ergocalciferol (VITAMIN D2) 50,000 units, TAKE ONE CAPSULE BY MOUTH ONE TIME PER WEEK, Disp: 12 capsule, Rfl: 0    FLUoxetine (PROzac) 20 mg capsule, Take 20 mg by mouth daily, Disp: , Rfl:     hydrochlorothiazide (HYDRODIURIL) 25 mg tablet, Take 25 mg by mouth, Disp: , Rfl:     montelukast (SINGULAIR) 10 mg tablet, TAKE 1 TABLET BY MOUTH EVERY DAY, Disp: 30 tablet, Rfl: 0    neomycin-polymyxin-hydrocortisone (CORTISPORIN) 0.35%-10,000 units/mL-1% otic suspension, 3 ggts in ear(s) qid x 7 days, Disp: 10 mL, Rfl: 0    Semaglutide-Weight Management (Wegovy) 0.25 MG/0.5ML, Inject 0.25 mg under the skin Once a week, Disp: , Rfl:     SUMAtriptan (IMITREX) 100 mg tablet, Take 1 tablet (100 mg total) by mouth once as needed for migraine for up to 1 dose Pt requests BRAND NECCESSARY (Patient not taking: Reported on 11/3/2022), Disp: 10 tablet, Rfl: 1      Amparo Fields    Scribe Attestation      I,:  Amparo Fields am acting as a scribe while in the presence of the attending physician.:        I,:  Mic Hayward MD personally performed the services described in this documentation    as scribed in my presence.:

## 2024-07-11 ENCOUNTER — OFFICE VISIT (OUTPATIENT)
Dept: OBGYN CLINIC | Facility: MEDICAL CENTER | Age: 59
End: 2024-07-11
Payer: COMMERCIAL

## 2024-07-11 VITALS
DIASTOLIC BLOOD PRESSURE: 84 MMHG | BODY MASS INDEX: 48.82 KG/M2 | WEIGHT: 293 LBS | SYSTOLIC BLOOD PRESSURE: 122 MMHG | HEART RATE: 74 BPM | HEIGHT: 65 IN

## 2024-07-11 DIAGNOSIS — M79.672 PAIN IN LEFT FOOT: Primary | ICD-10-CM

## 2024-07-11 PROCEDURE — 99212 OFFICE O/P EST SF 10 MIN: CPT | Performed by: EMERGENCY MEDICINE

## 2024-07-11 RX ORDER — DESVENLAFAXINE 25 MG/1
25 TABLET, EXTENDED RELEASE ORAL DAILY
COMMUNITY
Start: 2024-07-09

## 2024-07-11 NOTE — PROGRESS NOTES
"    Assessment/Plan:    Diagnoses and all orders for this visit:    Pain in left foot    Other orders  -     DULoxetine HCl 20 MG CSDR; Take 20 mg by mouth daily  -     Desvenlafaxine Succinate ER 25 MG TB24; Take 25 mg by mouth daily    Improved left foot pain with OTC arch supports    Return if symptoms worsen or fail to improve.      Subjective:   Patient ID: India Paniagua is a 58 y.o. female.    India returns with some improvement utilizing arch supports, pain 2/10 from 8/10.    She notes cramping of her feet if she stretches or wearing shoes with heels, but recently experienced it in the pool.  Denies N/T.  She does treat with outside PM&R and have received \"shots\" of her lower back  She recently had received a CSI left knee    Initial note:  NP presents for about a month of Left lateral foot pain       Review of Systems    The following portions of the patient's chart were reviewed and updated as appropriate:   Allergy:    Allergies   Allergen Reactions    Levonorgestrel-Ethinyl Estrad Headache    Seasonal Ic  [Cholestatin]        Medications:    Current Outpatient Medications:     ALPRAZolam (XANAX) 0.5 mg tablet, Take 1 tablet (0.5 mg total) by mouth daily at bedtime as needed for anxiety, Disp: 20 tablet, Rfl: 1    Desvenlafaxine Succinate ER 25 MG TB24, Take 25 mg by mouth daily, Disp: , Rfl:     DULoxetine HCl 20 MG CSDR, Take 20 mg by mouth daily, Disp: , Rfl:     ergocalciferol (VITAMIN D2) 50,000 units, TAKE ONE CAPSULE BY MOUTH ONE TIME PER WEEK, Disp: 12 capsule, Rfl: 0    FLUoxetine (PROzac) 20 mg capsule, Take 20 mg by mouth daily, Disp: , Rfl:     hydrochlorothiazide (HYDRODIURIL) 25 mg tablet, Take 25 mg by mouth, Disp: , Rfl:     montelukast (SINGULAIR) 10 mg tablet, TAKE 1 TABLET BY MOUTH EVERY DAY, Disp: 30 tablet, Rfl: 0    neomycin-polymyxin-hydrocortisone (CORTISPORIN) 0.35%-10,000 units/mL-1% otic suspension, 3 ggts in ear(s) qid x 7 days, Disp: 10 mL, Rfl: 0    Semaglutide-Weight " "Management (Wegovy) 0.25 MG/0.5ML, Inject 0.25 mg under the skin Once a week, Disp: , Rfl:     SUMAtriptan (IMITREX) 100 mg tablet, Take 1 tablet (100 mg total) by mouth once as needed for migraine for up to 1 dose Pt requests BRAND NECCESSARY (Patient not taking: Reported on 11/3/2022), Disp: 10 tablet, Rfl: 1    Patient Active Problem List   Diagnosis    Allergic rhinitis    Bilateral hip pain    DDD (degenerative disc disease), lumbar    Essential hypertension    Lumbar foraminal stenosis    Lumbosacral spondylosis    Morbid obesity (HCC)    Obstructive sleep apnea treated with continuous positive airway pressure (CPAP)    Vitamin D deficiency    Generalized anxiety disorder    Migraine without aura and without status migrainosus, not intractable    Reflux esophagitis    Oropharyngeal dysphagia    Seasonal allergies    Primary osteoarthritis of both knees    CCC (chronic calculous cholecystitis)    Calculus of gallbladder without cholecystitis without obstruction       Objective:  /84   Pulse 74   Ht 5' 5\" (1.651 m)   Wt (!) 137 kg (301 lb)   BMI 50.09 kg/m²     Ortho Exam    Physical Exam      Neurologic Exam    Procedures    I have personally reviewed the written report of the pertinent studies.             Past Medical History:   Diagnosis Date    Constipation     Hay fever     Pulmonary embolism (HCC)        Past Surgical History:   Procedure Laterality Date    HYSTERECTOMY  2013    MYOMECTOMY  1994    REDUCTION MAMMAPLASTY  2011       Social History     Socioeconomic History    Marital status: Single     Spouse name: Not on file    Number of children: Not on file    Years of education: Not on file    Highest education level: Not on file   Occupational History    Not on file   Tobacco Use    Smoking status: Never    Smokeless tobacco: Never   Vaping Use    Vaping status: Never Used   Substance and Sexual Activity    Alcohol use: Yes     Comment: socially    Drug use: No    Sexual activity: Not on " file   Other Topics Concern    Not on file   Social History Narrative    Not on file     Social Determinants of Health     Financial Resource Strain: Not on file   Food Insecurity: Not on file   Transportation Needs: Not on file   Physical Activity: Not on file   Stress: Not on file   Social Connections: Not on file   Intimate Partner Violence: Not on file   Housing Stability: Not on file       Family History   Problem Relation Age of Onset    Hypertension Mother     Rheum arthritis Mother     Prostate cancer Father     Hypertension Father     Heart attack Father     Glaucoma Father     Hypertension Maternal Grandmother     Stroke Maternal Grandmother     Heart attack Paternal Grandmother